# Patient Record
Sex: MALE | Race: WHITE | NOT HISPANIC OR LATINO | Employment: FULL TIME | URBAN - METROPOLITAN AREA
[De-identification: names, ages, dates, MRNs, and addresses within clinical notes are randomized per-mention and may not be internally consistent; named-entity substitution may affect disease eponyms.]

---

## 2018-06-18 DIAGNOSIS — E29.1 HYPOGONADISM IN MALE: Primary | ICD-10-CM

## 2018-06-18 RX ORDER — TESTOSTERONE CYPIONATE 200 MG/ML
INJECTION INTRAMUSCULAR
Qty: 10 ML | Refills: 5 | Status: SHIPPED | OUTPATIENT
Start: 2018-06-18 | End: 2018-06-20 | Stop reason: SDUPTHER

## 2018-06-18 NOTE — TELEPHONE ENCOUNTER
Pt needs script for depo-testosterone 200 mg/ one 10ml vial/ 3/4 of 1cc every 2 weeks/ the 10ml vial usually last 5 months/ [place] [employer] order/ if it needs to be faxed he advised that fax is [phone]/ has pending 11/21/18 appt

## 2018-06-20 DIAGNOSIS — E29.1 HYPOGONADISM IN MALE: ICD-10-CM

## 2018-06-20 RX ORDER — TESTOSTERONE CYPIONATE 200 MG/ML
INJECTION INTRAMUSCULAR
Qty: 10 ML | Refills: 0 | Status: SHIPPED | OUTPATIENT
Start: 2018-06-20 | End: 2019-09-17 | Stop reason: SDUPTHER

## 2018-06-20 NOTE — TELEPHONE ENCOUNTER
Pharmacy called to state that the prescription must say to "Inject intramuscular" or they will not fill the prescription

## 2018-11-06 ENCOUNTER — TELEPHONE (OUTPATIENT)
Dept: ENDOCRINOLOGY | Facility: HOSPITAL | Age: 63
End: 2018-11-06

## 2018-11-06 DIAGNOSIS — E29.1 HYPOGONADISM IN MALE: Primary | ICD-10-CM

## 2018-11-06 NOTE — TELEPHONE ENCOUNTER
Pt needs new lab slip for 11/21/18 appt  PSA, testosterone total and free  (E29 1)  Can be mailed   Thanks

## 2018-11-14 LAB — HBA1C MFR BLD HPLC: 6 %

## 2018-11-15 LAB
ERYTHROCYTE [DISTWIDTH] IN BLOOD BY AUTOMATED COUNT: 13.2 % (ref 12.3–15.4)
HCT VFR BLD AUTO: 48.9 % (ref 37.5–51)
HGB BLD-MCNC: 17.3 G/DL (ref 13–17.7)
MCH RBC QN AUTO: 31.6 PG (ref 26.6–33)
MCHC RBC AUTO-ENTMCNC: 35.4 G/DL (ref 31.5–35.7)
MCV RBC AUTO: 89 FL (ref 79–97)
PLATELET # BLD AUTO: 243 X10E3/UL (ref 150–379)
PSA SERPL-MCNC: 1.5 NG/ML (ref 0–4)
RBC # BLD AUTO: 5.48 X10E6/UL (ref 4.14–5.8)
TESTOST FREE SERPL-MCNC: 18.2 PG/ML (ref 6.6–18.1)
TESTOST SERPL-MCNC: 749 NG/DL (ref 264–916)
WBC # BLD AUTO: 6 X10E3/UL (ref 3.4–10.8)

## 2018-11-21 ENCOUNTER — OFFICE VISIT (OUTPATIENT)
Dept: ENDOCRINOLOGY | Facility: HOSPITAL | Age: 63
End: 2018-11-21
Payer: COMMERCIAL

## 2018-11-21 VITALS
HEART RATE: 103 BPM | SYSTOLIC BLOOD PRESSURE: 154 MMHG | HEIGHT: 71 IN | BODY MASS INDEX: 33.46 KG/M2 | DIASTOLIC BLOOD PRESSURE: 80 MMHG | WEIGHT: 239 LBS

## 2018-11-21 DIAGNOSIS — R73.03 PREDIABETES: ICD-10-CM

## 2018-11-21 DIAGNOSIS — E29.1 PRIMARY HYPOGONADISM IN MALE: Primary | ICD-10-CM

## 2018-11-21 PROCEDURE — 99204 OFFICE O/P NEW MOD 45 MIN: CPT | Performed by: INTERNAL MEDICINE

## 2018-11-21 RX ORDER — HYDROCHLOROTHIAZIDE 25 MG/1
25 TABLET ORAL DAILY
Refills: 0 | COMMUNITY
Start: 2018-11-19

## 2018-11-21 RX ORDER — OMEPRAZOLE 40 MG/1
40 CAPSULE, DELAYED RELEASE ORAL DAILY
Refills: 0 | COMMUNITY
Start: 2018-11-19 | End: 2021-02-01 | Stop reason: ALTCHOICE

## 2018-11-21 NOTE — LETTER
November 21, 2018     Corrine Halsted  [address]  [place] [address]    Patient: Cyn Marino   YOB: 1955   Date of Visit: 11/21/2018       Dear Dr Maryse Kumar: Thank you for referring Cyn Marino to me for evaluation  Below are my notes for this consultation  If you have questions, please do not hesitate to call me  I look forward to following your patient along with you  Sincerely,        Diana Sadler DO        CC: No Recipients  Diana Sadler DO  11/21/2018  8:12 AM  Sign at close encounter  11/21/2018    Assessment/Plan      Diagnoses and all orders for this visit:    Primary hypogonadism in male  -     Testosterone, free, total Lab Collect  -     CBC and Platelet- Lab Collect  -     Testosterone, free, total Lab Collect; Future  -     CBC and Platelet- Lab Collect; Future  -     Testosterone, free, total Lab Collect  -     CBC and Platelet- Lab Collect  -     PSA, total screen Lab Collect; Future    Prediabetes  -     HEMOGLOBIN A1C W/ EAG ESTIMATION Lab Collect; Future  -     Comprehensive metabolic panel Lab Collect; Future  -     Lipid Panel with Direct LDL reflex Lab Collect; Future  -     HEMOGLOBIN A1C W/ EAG ESTIMATION Lab Collect  -     Comprehensive metabolic panel Lab Collect  -     Lipid Panel with Direct LDL reflex Lab Collect    Other orders  -     omeprazole (PriLOSEC) 40 MG capsule; Take 40 mg by mouth daily  -     hydrochlorothiazide (HYDRODIURIL) 25 mg tablet; Take 25 mg by mouth daily        Assessment/Plan:  1  Primary hypogonadism:   Clinically he is doing well on testosterone cypionate 200mg/ml 0 75 cc every 2 weeks  His recent testosterone level was 7-8 days after an injection  I have suggested we recheck a free and total testosterone along with CBC about 3 days after an injection to make sure his peak is not going too high  If it is we will adjust his dose appropriately    Otherwise we will plan to see him back in 1 year with labs as ordered above just prior  2   Prediabetes:  Discussed diet, exercise, lifestyle modifications  Will check an A1c and CMP before next appointment which will be in 1 year  CC:   Hypogonadism    History of Present Illness     HPI: Alex Hernandez is a 63y o  year old male with history of primary hypogonadism who presents to establish care  Previously followed doctor Carrington  He is currently managed on testosterone cypionate 75 cc every 2 weeks  He presents today to establish care and overall is feeling well  He notes adequate energy, muscle strength, libido, denies erectile dysfunction  He states he has primary hypogonadism due to 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  He has been on testosterone shots since he was in his 25s  He has not been interested in any gel, intranasal, or other Testosterone method  He does not want to do the testosterone more than every 2 weeks as yes to go to his primary care doctor for the injection  Otherwise he feels well  He has a personal history of prediabetes and is working hard to monitor diet, exercise, lifestyle factors  Review of Systems   Constitutional: Negative for fatigue  HENT: Negative for trouble swallowing and voice change  Eyes: Negative for visual disturbance  Respiratory: Negative for shortness of breath  Cardiovascular: Negative for palpitations and leg swelling  Gastrointestinal: Negative for abdominal pain, nausea and vomiting  Endocrine: Negative for cold intolerance, heat intolerance, polydipsia and polyuria  Musculoskeletal: Negative for arthralgias and myalgias  Skin: Negative for rash  Neurological: Negative for dizziness, tremors and weakness  Hematological: Negative for adenopathy  Psychiatric/Behavioral: Negative for agitation and confusion  Historical Information   No past medical history on file  No past surgical history on file    Social History   History   Alcohol use Not on file     History   Drug use: Unknown     History   Smoking Status    Never Smoker   Smokeless Tobacco    Never Used     Family History:   Family History   Problem Relation Age of Onset    Diabetes type II Mother     Heart attack Mother     Heart disease Father     Diabetes type II Father     Osteoporosis Sister     Diabetes type II Brother     Heart disease Brother     No Known Problems Brother        Meds/Allergies   Current Outpatient Prescriptions   Medication Sig Dispense Refill    hydrochlorothiazide (HYDRODIURIL) 25 mg tablet Take 25 mg by mouth daily  0    omeprazole (PriLOSEC) 40 MG capsule Take 40 mg by mouth daily  0    testosterone cypionate (DEPO-TESTOSTERONE) 200 mg/mL SOLN Inject intramuscular 0 75 cc every 14 days  10 mL 0     No current facility-administered medications for this visit  No Known Allergies    Objective   Vitals: Blood pressure 154/80, pulse 103, height 5' 11" (1 803 m), weight 108 kg (239 lb)  Invasive Devices          No matching active lines, drains, or airways          Physical Exam   Constitutional: He is oriented to person, place, and time  He appears well-developed and well-nourished  No distress  HENT:   Head: Normocephalic and atraumatic  Eyes: Pupils are equal, round, and reactive to light  Conjunctivae are normal    Neck: Normal range of motion  Neck supple  No thyromegaly present  Cardiovascular: Normal rate and regular rhythm  Pulmonary/Chest: Effort normal and breath sounds normal  No respiratory distress  Abdominal: Soft  Bowel sounds are normal  He exhibits no distension  Musculoskeletal: Normal range of motion  He exhibits no edema  Neurological: He is alert and oriented to person, place, and time  He exhibits normal muscle tone  Skin: Skin is warm and dry  No rash noted  He is not diaphoretic  Psychiatric: He has a normal mood and affect  His behavior is normal    Vitals reviewed        The history was obtained from the review of the chart and from the patient  Lab Results:      Recent Results (from the past 04766 hour(s))   CBC    Collection Time: 11/14/18  7:25 AM   Result Value Ref Range    White Blood Cell Count 6 0 3 4 - 10 8 x10E3/uL    Red Blood Cell Count 5 48 4 14 - 5 80 x10E6/uL    Hemoglobin 17 3 13 0 - 17 7 g/dL    HCT 48 9 37 5 - 51 0 %    MCV 89 79 - 97 fL    MCH 31 6 26 6 - 33 0 pg    MCHC 35 4 31 5 - 35 7 g/dL    RDW 13 2 12 3 - 15 4 %    Platelet Count 111 935 - 379 x10E3/uL   Testosterone, free, total    Collection Time: 11/14/18  7:25 AM   Result Value Ref Range    TESTOSTERONE TOTAL 749 264 - 916 ng/dL    Testosterone, Free 18 2 (H) 6 6 - 18 1 pg/mL   PSA Total, Diagnostic    Collection Time: 11/14/18  7:25 AM   Result Value Ref Range    Prostate Specific Antigen Total 1 5 0 0 - 4 0 ng/mL     Labs from [facility] on 11/14/2018:  Hematocrit 47 3, glucose 112 fasting, liver function within normal limits, , HDL 46, total cholesterol 125, TSH 2 11, free T4 1 0, A1c 6 0  No future appointments  Portions of the record may have been created with voice recognition software  Occasional wrong word or "sound a like" substitutions may have occurred due to the inherent limitations of voice recognition software  Read the chart carefully and recognize, using context, where substitutions have occurred

## 2018-11-21 NOTE — PROGRESS NOTES
11/21/2018    Assessment/Plan      Diagnoses and all orders for this visit:    Primary hypogonadism in male  -     Testosterone, free, total Lab Collect  -     CBC and Platelet- Lab Collect  -     Testosterone, free, total Lab Collect; Future  -     CBC and Platelet- Lab Collect; Future  -     Testosterone, free, total Lab Collect  -     CBC and Platelet- Lab Collect  -     PSA, total screen Lab Collect; Future    Prediabetes  -     HEMOGLOBIN A1C W/ EAG ESTIMATION Lab Collect; Future  -     Comprehensive metabolic panel Lab Collect; Future  -     Lipid Panel with Direct LDL reflex Lab Collect; Future  -     HEMOGLOBIN A1C W/ EAG ESTIMATION Lab Collect  -     Comprehensive metabolic panel Lab Collect  -     Lipid Panel with Direct LDL reflex Lab Collect    Other orders  -     omeprazole (PriLOSEC) 40 MG capsule; Take 40 mg by mouth daily  -     hydrochlorothiazide (HYDRODIURIL) 25 mg tablet; Take 25 mg by mouth daily        Assessment/Plan:  1  Primary hypogonadism:   Clinically he is doing well on testosterone cypionate 200mg/ml 0 75 cc every 2 weeks  His recent testosterone level was 7-8 days after an injection  I have suggested we recheck a free and total testosterone along with CBC about 3 days after an injection to make sure his peak is not going too high  If it is we will adjust his dose appropriately  Otherwise we will plan to see him back in 1 year with labs as ordered above just prior  2   Prediabetes:  Discussed diet, exercise, lifestyle modifications  Will check an A1c and CMP before next appointment which will be in 1 year  CC:   Hypogonadism    History of Present Illness     HPI: Susanna Ashley is a 63y o  year old male with history of primary hypogonadism who presents to establish care  Previously followed doctor Shultz  He is currently managed on testosterone cypionate 75 cc every 2 weeks  He presents today to establish care and overall is feeling well    He notes adequate energy, muscle strength, libido, denies erectile dysfunction  He states he has primary hypogonadism due to 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  He has been on testosterone shots since he was in his 25s  He has not been interested in any gel, intranasal, or other Testosterone method  He does not want to do the testosterone more than every 2 weeks as yes to go to his primary care doctor for the injection  Otherwise he feels well  He has a personal history of prediabetes and is working hard to monitor diet, exercise, lifestyle factors  Review of Systems   Constitutional: Negative for fatigue  HENT: Negative for trouble swallowing and voice change  Eyes: Negative for visual disturbance  Respiratory: Negative for shortness of breath  Cardiovascular: Negative for palpitations and leg swelling  Gastrointestinal: Negative for abdominal pain, nausea and vomiting  Endocrine: Negative for cold intolerance, heat intolerance, polydipsia and polyuria  Musculoskeletal: Negative for arthralgias and myalgias  Skin: Negative for rash  Neurological: Negative for dizziness, tremors and weakness  Hematological: Negative for adenopathy  Psychiatric/Behavioral: Negative for agitation and confusion  Historical Information   No past medical history on file  No past surgical history on file    Social History   History   Alcohol use Not on file     History   Drug use: Unknown     History   Smoking Status    Never Smoker   Smokeless Tobacco    Never Used     Family History:   Family History   Problem Relation Age of Onset    Diabetes type II Mother     Heart attack Mother     Heart disease Father     Diabetes type II Father     Osteoporosis Sister     Diabetes type II Brother     Heart disease Brother     No Known Problems Brother        Meds/Allergies   Current Outpatient Prescriptions   Medication Sig Dispense Refill    hydrochlorothiazide (HYDRODIURIL) 25 mg tablet Take 25 mg by mouth daily  0    omeprazole (PriLOSEC) 40 MG capsule Take 40 mg by mouth daily  0    testosterone cypionate (DEPO-TESTOSTERONE) 200 mg/mL SOLN Inject intramuscular 0 75 cc every 14 days  10 mL 0     No current facility-administered medications for this visit  No Known Allergies    Objective   Vitals: Blood pressure 154/80, pulse 103, height 5' 11" (1 803 m), weight 108 kg (239 lb)  Invasive Devices          No matching active lines, drains, or airways          Physical Exam   Constitutional: He is oriented to person, place, and time  He appears well-developed and well-nourished  No distress  HENT:   Head: Normocephalic and atraumatic  Eyes: Pupils are equal, round, and reactive to light  Conjunctivae are normal    Neck: Normal range of motion  Neck supple  No thyromegaly present  Cardiovascular: Normal rate and regular rhythm  Pulmonary/Chest: Effort normal and breath sounds normal  No respiratory distress  Abdominal: Soft  Bowel sounds are normal  He exhibits no distension  Musculoskeletal: Normal range of motion  He exhibits no edema  Neurological: He is alert and oriented to person, place, and time  He exhibits normal muscle tone  Skin: Skin is warm and dry  No rash noted  He is not diaphoretic  Psychiatric: He has a normal mood and affect  His behavior is normal    Vitals reviewed  The history was obtained from the review of the chart and from the patient      Lab Results:      Recent Results (from the past 17396 hour(s))   CBC    Collection Time: 11/14/18  7:25 AM   Result Value Ref Range    White Blood Cell Count 6 0 3 4 - 10 8 x10E3/uL    Red Blood Cell Count 5 48 4 14 - 5 80 x10E6/uL    Hemoglobin 17 3 13 0 - 17 7 g/dL    HCT 48 9 37 5 - 51 0 %    MCV 89 79 - 97 fL    MCH 31 6 26 6 - 33 0 pg    MCHC 35 4 31 5 - 35 7 g/dL    RDW 13 2 12 3 - 15 4 %    Platelet Count 106 053 - 379 x10E3/uL   Testosterone, free, total    Collection Time: 11/14/18  7:25 AM Result Value Ref Range    TESTOSTERONE TOTAL 749 264 - 916 ng/dL    Testosterone, Free 18 2 (H) 6 6 - 18 1 pg/mL   PSA Total, Diagnostic    Collection Time: 11/14/18  7:25 AM   Result Value Ref Range    Prostate Specific Antigen Total 1 5 0 0 - 4 0 ng/mL     Labs from [address] [place] [address] on 11/14/2018:  Hematocrit 47 3, glucose 112 fasting, liver function within normal limits, , HDL 46, total cholesterol 125, TSH 2 11, free T4 1 0, A1c 6 0  No future appointments  Portions of the record may have been created with voice recognition software  Occasional wrong word or "sound a like" substitutions may have occurred due to the inherent limitations of voice recognition software  Read the chart carefully and recognize, using context, where substitutions have occurred

## 2019-03-04 ENCOUNTER — TELEPHONE (OUTPATIENT)
Dept: ENDOCRINOLOGY | Facility: HOSPITAL | Age: 64
End: 2019-03-04

## 2019-03-04 NOTE — TELEPHONE ENCOUNTER
Testosterone level from before suggests the testosterone was being absorbed fine  I would suggest outer thigh as the preferred option  I do not think there is any difference if it is given over 8-10 seconds vs faster than that  Yes he can be taught to inject it himself if he wants we can show him

## 2019-03-04 NOTE — TELEPHONE ENCOUNTER
Spoke to patient & told him what you advised  He has a few more questions  Wants to know if you think it was a mistake to get the shot in the arm all these years? Does the testosterone not disperse as well in the body as when given in the thigh or buttocks? Would like you to rate the 3 areas from best to worst   Should the shot be given slowly (8-10 seconds) & will it disperse the same as it would if given fast?  Can he be taught to give himself the injections? Basically he's concerned about why his PCP doesn't want to give him the shot in the arm anymore & that she's giving the shot too quickly

## 2019-03-04 NOTE — TELEPHONE ENCOUNTER
Patient's pcp has been giving him testosterone shots every 2 weeks  She does not want to give it to patient in his arm anymore  He would like suggestions on better options  Which area would your recommend for shot (arm, buttocks, thigh)? ? And what area do you feel would work the best?? He is not due for another shot until next Tuesday   Please advise

## 2019-03-05 NOTE — TELEPHONE ENCOUNTER
Spoke to patient and relayed all of the information  Told him Lissette Brown could teach him how to self administer the injections if he's interested  He'll think about it & schedule an appt if he decides to do it

## 2019-04-26 ENCOUNTER — TELEPHONE (OUTPATIENT)
Dept: ENDOCRINOLOGY | Facility: HOSPITAL | Age: 64
End: 2019-04-26

## 2019-05-10 ENCOUNTER — TELEPHONE (OUTPATIENT)
Dept: ENDOCRINOLOGY | Facility: HOSPITAL | Age: 64
End: 2019-05-10

## 2019-09-12 ENCOUNTER — TELEPHONE (OUTPATIENT)
Dept: ENDOCRINOLOGY | Facility: HOSPITAL | Age: 64
End: 2019-09-12

## 2019-09-17 DIAGNOSIS — E29.1 HYPOGONADISM IN MALE: ICD-10-CM

## 2019-09-17 RX ORDER — TESTOSTERONE CYPIONATE 200 MG/ML
INJECTION, SOLUTION INTRAMUSCULAR
Qty: 1 ML | Refills: 5 | Status: SHIPPED | OUTPATIENT
Start: 2019-09-17 | End: 2019-09-23

## 2019-09-17 RX ORDER — TESTOSTERONE CYPIONATE 200 MG/ML
INJECTION INTRAMUSCULAR
Qty: 1 ML | Refills: 0 | Status: SHIPPED | OUTPATIENT
Start: 2019-09-17 | End: 2019-09-17 | Stop reason: SDUPTHER

## 2019-09-23 DIAGNOSIS — E29.1 HYPOGONADISM IN MALE: ICD-10-CM

## 2019-09-24 ENCOUNTER — TELEPHONE (OUTPATIENT)
Dept: ENDOCRINOLOGY | Facility: HOSPITAL | Age: 64
End: 2019-09-24

## 2019-09-24 RX ORDER — TESTOSTERONE CYPIONATE 200 MG/ML
INJECTION, SOLUTION INTRAMUSCULAR
Qty: 2 ML | Refills: 5 | Status: SHIPPED | OUTPATIENT
Start: 2019-09-24 | End: 2019-10-30 | Stop reason: SDUPTHER

## 2019-09-24 NOTE — TELEPHONE ENCOUNTER
I had called patient to because we received a call from [address] [place] [address] from the answering service  Patient wanted you to be aware he is getting his injection today and he will have his 3 day labs after the injection done on Friday

## 2019-10-28 LAB
ERYTHROCYTE [DISTWIDTH] IN BLOOD BY AUTOMATED COUNT: 13.5 % (ref 12.3–15.4)
HCT VFR BLD AUTO: 49.3 % (ref 37.5–51)
HGB BLD-MCNC: 16.6 G/DL (ref 13–17.7)
MCH RBC QN AUTO: 30.7 PG (ref 26.6–33)
MCHC RBC AUTO-ENTMCNC: 33.7 G/DL (ref 31.5–35.7)
MCV RBC AUTO: 91 FL (ref 79–97)
PLATELET # BLD AUTO: 236 X10E3/UL (ref 150–450)
RBC # BLD AUTO: 5.4 X10E6/UL (ref 4.14–5.8)
TESTOST FREE SERPL-MCNC: 26 PG/ML (ref 6.6–18.1)
TESTOST SERPL-MCNC: 962 NG/DL (ref 264–916)
WBC # BLD AUTO: 5.9 X10E3/UL (ref 3.4–10.8)

## 2019-10-30 DIAGNOSIS — E29.1 HYPOGONADISM IN MALE: ICD-10-CM

## 2019-10-30 DIAGNOSIS — E29.1 HYPOGONADISM IN MALE: Primary | ICD-10-CM

## 2019-10-30 RX ORDER — TESTOSTERONE CYPIONATE 200 MG/ML
INJECTION, SOLUTION INTRAMUSCULAR
Qty: 2 ML | Refills: 5 | Status: SHIPPED | OUTPATIENT
Start: 2019-10-30 | End: 2020-03-16 | Stop reason: SDUPTHER

## 2019-10-30 NOTE — TELEPHONE ENCOUNTER
Patient would like to let you know that his labs from 10/26 were done 4 days after the injection     He would like to know if he should wait 8 days after the injection for the next set of blood work?     Fax script to [phone]

## 2019-11-16 LAB
ALBUMIN SERPL-MCNC: 4.5 G/DL (ref 3.6–4.8)
ALBUMIN/GLOB SERPL: 1.6 {RATIO} (ref 1.2–2.2)
ALP SERPL-CCNC: 70 IU/L (ref 39–117)
ALT SERPL-CCNC: 54 IU/L (ref 0–44)
AST SERPL-CCNC: 38 IU/L (ref 0–40)
BILIRUB SERPL-MCNC: 1.1 MG/DL (ref 0–1.2)
BUN SERPL-MCNC: 19 MG/DL (ref 8–27)
BUN/CREAT SERPL: 19 (ref 10–24)
CALCIUM SERPL-MCNC: 9.6 MG/DL (ref 8.6–10.2)
CHLORIDE SERPL-SCNC: 97 MMOL/L (ref 96–106)
CHOLEST SERPL-MCNC: 214 MG/DL (ref 100–199)
CO2 SERPL-SCNC: 25 MMOL/L (ref 20–29)
CREAT SERPL-MCNC: 0.99 MG/DL (ref 0.76–1.27)
ERYTHROCYTE [DISTWIDTH] IN BLOOD BY AUTOMATED COUNT: 13.6 % (ref 12.3–15.4)
EST. AVERAGE GLUCOSE BLD GHB EST-MCNC: 126 MG/DL
GLOBULIN SER-MCNC: 2.8 G/DL (ref 1.5–4.5)
GLUCOSE SERPL-MCNC: 117 MG/DL (ref 65–99)
HBA1C MFR BLD: 6 % (ref 4.8–5.6)
HCT VFR BLD AUTO: 48.9 % (ref 37.5–51)
HDLC SERPL-MCNC: 47 MG/DL
HGB BLD-MCNC: 16.8 G/DL (ref 13–17.7)
LDLC SERPL CALC-MCNC: 140 MG/DL (ref 0–99)
LDLC/HDLC SERPL: 3 RATIO (ref 0–3.6)
MCH RBC QN AUTO: 30.8 PG (ref 26.6–33)
MCHC RBC AUTO-ENTMCNC: 34.4 G/DL (ref 31.5–35.7)
MCV RBC AUTO: 90 FL (ref 79–97)
PLATELET # BLD AUTO: 233 X10E3/UL (ref 150–450)
POTASSIUM SERPL-SCNC: 4.1 MMOL/L (ref 3.5–5.2)
PROT SERPL-MCNC: 7.3 G/DL (ref 6–8.5)
PSA SERPL-MCNC: 1.1 NG/ML (ref 0–4)
RBC # BLD AUTO: 5.46 X10E6/UL (ref 4.14–5.8)
SL AMB EGFR AFRICAN AMERICAN: 95 ML/MIN/1.73
SL AMB EGFR NON AFRICAN AMERICAN: 82 ML/MIN/1.73
SL AMB VLDL CHOLESTEROL CALC: 27 MG/DL (ref 5–40)
SODIUM SERPL-SCNC: 138 MMOL/L (ref 134–144)
TESTOST FREE SERPL-MCNC: 11 PG/ML (ref 6.6–18.1)
TESTOST SERPL-MCNC: 453 NG/DL (ref 264–916)
TRIGL SERPL-MCNC: 137 MG/DL (ref 0–149)
WBC # BLD AUTO: 5.6 X10E3/UL (ref 3.4–10.8)

## 2019-12-02 ENCOUNTER — OFFICE VISIT (OUTPATIENT)
Dept: ENDOCRINOLOGY | Facility: HOSPITAL | Age: 64
End: 2019-12-02
Payer: COMMERCIAL

## 2019-12-02 VITALS
DIASTOLIC BLOOD PRESSURE: 80 MMHG | SYSTOLIC BLOOD PRESSURE: 126 MMHG | BODY MASS INDEX: 32.96 KG/M2 | WEIGHT: 235.4 LBS | HEIGHT: 71 IN | HEART RATE: 87 BPM

## 2019-12-02 DIAGNOSIS — R73.03 PREDIABETES: ICD-10-CM

## 2019-12-02 DIAGNOSIS — E29.1 PRIMARY HYPOGONADISM IN MALE: Primary | ICD-10-CM

## 2019-12-02 PROCEDURE — 99214 OFFICE O/P EST MOD 30 MIN: CPT | Performed by: INTERNAL MEDICINE

## 2019-12-02 NOTE — PROGRESS NOTES
12/2/2019    Assessment/Plan      Diagnoses and all orders for this visit:    Primary hypogonadism in male  -     Testosterone, free, total Lab Collect; Future  -     Comprehensive metabolic panel Lab Collect; Future  -     PSA, total screen Lab Collect; Future  -     Lipid Panel with Direct LDL reflex Lab Collect; Future  -     CBC and differential Lab Collect; Future  -     Testosterone, free, total Lab Collect  -     Comprehensive metabolic panel Lab Collect  -     Lipid Panel with Direct LDL reflex Lab Collect  -     CBC and differential Lab Collect  -     Testosterone, free, total Lab Collect; Future  -     CBC and differential Lab Collect; Future  -     Testosterone, free, total Lab Collect  -     CBC and differential Lab Collect    Prediabetes  -     HEMOGLOBIN A1C W/ EAG ESTIMATION Lab Collect; Future  -     HEMOGLOBIN A1C W/ EAG ESTIMATION Lab Collect        Assessment/Plan:  1  Primary hypogonadism:  Clinically and biochemically doing well on current regimen  Follow-up in 1 year with labs as ordered above just prior  He can call sooner with any questions or concerns  His prior provider monitored testosterone levels both 4 days as well as 9 or 10 days after an injection which I have ordered  2  Prediabetes:  Stable on current regimen  Encourage continue lifestyle intervention  CC: Follow-up    History of Present Illness     HPI: Pato Wills is a 66y o  year old male with history of primary hypogonadism who presents for a follow-up appointment  He is maintained on depo testosterone 200 mg/ml, 0 75 cc every 14 days  He has primary hypogonadism due to 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  He has been on testosterone injections since his 25s  In the past he has not been interested in any type of transdermal testosterone supplementation  He gets the injection through his family doctor  Presents today overall feeling well    No new health issues or symptoms of concern  Review of Systems   Constitutional: Negative for fatigue  HENT: Negative for trouble swallowing and voice change  Eyes: Negative for visual disturbance  Respiratory: Negative for shortness of breath  Cardiovascular: Negative for palpitations and leg swelling  Gastrointestinal: Negative for abdominal pain, nausea and vomiting  Endocrine: Negative for polydipsia and polyuria  Musculoskeletal: Negative for arthralgias and myalgias  Skin: Negative for rash  Neurological: Negative for dizziness, tremors and weakness  Hematological: Negative for adenopathy  Psychiatric/Behavioral: Negative for agitation and confusion  Historical Information   History reviewed  No pertinent past medical history  History reviewed  No pertinent surgical history  Social History   Social History     Substance and Sexual Activity   Alcohol Use Not on file     Social History     Substance and Sexual Activity   Drug Use Not on file     Social History     Tobacco Use   Smoking Status Never Smoker   Smokeless Tobacco Never Used     Family History:   Family History   Problem Relation Age of Onset    Diabetes type II Mother     Heart attack Mother     Heart disease Father     Diabetes type II Father     Osteoporosis Sister     Diabetes type II Brother     Heart disease Brother     No Known Problems Brother        Meds/Allergies   Current Outpatient Medications   Medication Sig Dispense Refill    DEPO-TESTOSTERONE 200 MG/ML SOLN Inject intramuscular 0 75 cc every 14 days  BRAND AS PREVIOUSLY RECEIVED  2 mL 5    hydrochlorothiazide (HYDRODIURIL) 25 mg tablet Take 25 mg by mouth daily  0    omeprazole (PriLOSEC) 40 MG capsule Take 40 mg by mouth daily  0     No current facility-administered medications for this visit  No Known Allergies    Objective   Vitals: Blood pressure 126/80, pulse 87, height 5' 11" (1 803 m), weight 107 kg (235 lb 6 4 oz)    Invasive Devices     None Physical Exam   Constitutional: He is oriented to person, place, and time  He appears well-developed and well-nourished  No distress  HENT:   Head: Normocephalic and atraumatic  Neck: Normal range of motion  Neck supple  No thyromegaly present  Cardiovascular: Normal rate and regular rhythm  Pulmonary/Chest: Effort normal and breath sounds normal  No respiratory distress  Abdominal: Soft  Bowel sounds are normal    Musculoskeletal: Normal range of motion  He exhibits no edema  Neurological: He is alert and oriented to person, place, and time  He exhibits normal muscle tone  Skin: Skin is warm and dry  No rash noted  He is not diaphoretic  Psychiatric: He has a normal mood and affect  His behavior is normal    Vitals reviewed  The history was obtained from the review of the chart and from the patient      Lab Results:      Recent Results (from the past 82942 hour(s))   Hemoglobin A1C    Collection Time: 11/14/18 12:00 AM   Result Value Ref Range    Hemoglobin A1C 6 0    CBC    Collection Time: 11/14/18  7:25 AM   Result Value Ref Range    White Blood Cell Count 6 0 3 4 - 10 8 x10E3/uL    Red Blood Cell Count 5 48 4 14 - 5 80 x10E6/uL    Hemoglobin 17 3 13 0 - 17 7 g/dL    HCT 48 9 37 5 - 51 0 %    MCV 89 79 - 97 fL    MCH 31 6 26 6 - 33 0 pg    MCHC 35 4 31 5 - 35 7 g/dL    RDW 13 2 12 3 - 15 4 %    Platelet Count 684 386 - 379 x10E3/uL   Testosterone, free, total    Collection Time: 11/14/18  7:25 AM   Result Value Ref Range    TESTOSTERONE TOTAL 749 264 - 916 ng/dL    Testosterone, Free 18 2 (H) 6 6 - 18 1 pg/mL   PSA Total, Diagnostic    Collection Time: 11/14/18  7:25 AM   Result Value Ref Range    Prostate Specific Antigen Total 1 5 0 0 - 4 0 ng/mL   Testosterone, free, total Lab Collect    Collection Time: 10/26/19  8:08 AM   Result Value Ref Range    TESTOSTERONE TOTAL 962 (H) 264 - 916 ng/dL    Testosterone, Free 26 0 (H) 6 6 - 18 1 pg/mL   CBC and Platelet- Lab Collect    Collection Time: 10/26/19  8:08 AM   Result Value Ref Range    White Blood Cell Count 5 9 3 4 - 10 8 x10E3/uL    Red Blood Cell Count 5 40 4  14 - 5 80 x10E6/uL    Hemoglobin 16 6 13 0 - 17 7 g/dL    HCT 49 3 37 5 - 51 0 %    MCV 91 79 - 97 fL    MCH 30 7 26 6 - 33 0 pg    MCHC 33 7 31 5 - 35 7 g/dL    RDW 13 5 12 3 - 15 4 %    Platelet Count 439 624 - 450 x10E3/uL   Testosterone, free, total Lab Collect    Collection Time: 11/15/19  7:12 AM   Result Value Ref Range    TESTOSTERONE TOTAL 453 264 - 916 ng/dL    Testosterone, Free 11 0 6 6 - 18 1 pg/mL   CBC and Platelet- Lab Collect    Collection Time: 11/15/19  7:12 AM   Result Value Ref Range    White Blood Cell Count 5 6 3 4 - 10 8 x10E3/uL    Red Blood Cell Count 5 46 4 14 - 5 80 x10E6/uL    Hemoglobin 16 8 13 0 - 17 7 g/dL    HCT 48 9 37 5 - 51 0 %    MCV 90 79 - 97 fL    MCH 30 8 26 6 - 33 0 pg    MCHC 34 4 31 5 - 35 7 g/dL    RDW 13 6 12 3 - 15 4 %    Platelet Count 571 965 - 450 x10E3/uL   HEMOGLOBIN A1C W/ EAG ESTIMATION Lab Collect    Collection Time: 11/15/19  7:12 AM   Result Value Ref Range    Hemoglobin A1C 6 0 (H) 4 8 - 5 6 %    Estimated Average Glucose 126 mg/dL   Comprehensive metabolic panel Lab Collect    Collection Time: 11/15/19  7:12 AM   Result Value Ref Range    Glucose, Random 117 (H) 65 - 99 mg/dL    BUN 19 8 - 27 mg/dL    Creatinine 0 99 0 76 - 1 27 mg/dL    eGFR Non  82 >59 mL/min/1 73    eGFR  95 >59 mL/min/1 73    SL AMB BUN/CREATININE RATIO 19 10 - 24    Sodium 138 134 - 144 mmol/L    Potassium 4 1 3 5 - 5 2 mmol/L    Chloride 97 96 - 106 mmol/L    CO2 25 20 - 29 mmol/L    CALCIUM 9 6 8 6 - 10 2 mg/dL    Protein, Total 7 3 6 0 - 8 5 g/dL    Albumin 4 5 3 6 - 4 8 g/dL    Globulin, Total 2 8 1 5 - 4 5 g/dL    Albumin/Globulin Ratio 1 6 1 2 - 2 2    TOTAL BILIRUBIN 1 1 0 0 - 1 2 mg/dL    Alk Phos Isoenzymes 70 39 - 117 IU/L    AST 38 0 - 40 IU/L    ALT 54 (H) 0 - 44 IU/L   Lipid Panel with Direct LDL reflex Lab Collect Collection Time: 11/15/19  7:12 AM   Result Value Ref Range    Cholesterol, Total 214 (H) 100 - 199 mg/dL    Triglycerides 137 0 - 149 mg/dL    HDL 47 >39 mg/dL    VLDL Cholesterol Calculated 27 5 - 40 mg/dL    LDL Direct 140 (H) 0 - 99 mg/dL    LDl/HDL Ratio 3 0 0 0 - 3 6 ratio   PSA Total, Diagnostic    Collection Time: 11/15/19  7:12 AM   Result Value Ref Range    Prostate Specific Antigen Total 1 1 0 0 - 4 0 ng/mL         No future appointments  Portions of the record may have been created with voice recognition software  Occasional wrong word or "sound a like" substitutions may have occurred due to the inherent limitations of voice recognition software  Read the chart carefully and recognize, using context, where substitutions have occurred

## 2020-03-16 DIAGNOSIS — E29.1 HYPOGONADISM IN MALE: ICD-10-CM

## 2020-03-16 RX ORDER — TESTOSTERONE CYPIONATE 200 MG/ML
INJECTION, SOLUTION INTRAMUSCULAR
Qty: 2 ML | Refills: 1 | Status: SHIPPED | OUTPATIENT
Start: 2020-03-16 | End: 2020-03-30 | Stop reason: SDUPTHER

## 2020-03-30 DIAGNOSIS — E29.1 HYPOGONADISM IN MALE: ICD-10-CM

## 2020-03-30 RX ORDER — TESTOSTERONE CYPIONATE 200 MG/ML
INJECTION INTRAMUSCULAR
Qty: 2 ML | Refills: 1 | Status: SHIPPED | OUTPATIENT
Start: 2020-03-30 | End: 2020-09-01 | Stop reason: SDUPTHER

## 2020-03-30 NOTE — TELEPHONE ENCOUNTER
Spoke with patient's pharmacy  He is requesting the Depo-testosterone 200mg/ml be written with a JAMES so that the patient will received what is cheaper  Whether it be brand or generic  Please advise

## 2020-04-23 ENCOUNTER — TELEPHONE (OUTPATIENT)
Dept: ENDOCRINOLOGY | Facility: HOSPITAL | Age: 65
End: 2020-04-23

## 2020-09-01 ENCOUNTER — TELEPHONE (OUTPATIENT)
Dept: ENDOCRINOLOGY | Facility: HOSPITAL | Age: 65
End: 2020-09-01

## 2020-09-01 DIAGNOSIS — E29.1 HYPOGONADISM IN MALE: Primary | ICD-10-CM

## 2020-09-01 RX ORDER — TESTOSTERONE CYPIONATE 200 MG/ML
INJECTION, SOLUTION INTRAMUSCULAR
Qty: 10 ML | Refills: 0 | Status: SHIPPED | OUTPATIENT
Start: 2020-09-01 | End: 2020-09-18 | Stop reason: SDUPTHER

## 2020-09-01 NOTE — TELEPHONE ENCOUNTER
[facility] sent a request to refill pt's Testosterone but he hasn't been seen since December and no follow up is scheduled  Are you ok with filling a 90 day with 0 refills and advise follow up for December?

## 2020-09-18 DIAGNOSIS — E29.1 HYPOGONADISM IN MALE: Primary | ICD-10-CM

## 2020-09-18 LAB
CREAT ?TM UR-SCNC: 221 UMOL/L
EXT MICROALBUMIN URINE RANDOM: 5
HBA1C MFR BLD HPLC: 6 %
MICROALBUMIN/CREAT UR: 2 MG/G{CREAT}

## 2020-09-18 RX ORDER — TESTOSTERONE CYPIONATE 200 MG/ML
INJECTION INTRAMUSCULAR
Qty: 10 ML | Refills: 0 | Status: SHIPPED | OUTPATIENT
Start: 2020-09-18 | End: 2021-01-22

## 2020-09-18 NOTE — TELEPHONE ENCOUNTER
Pt called and said [facility] Haim is telling him he needs another script for his testosterone sent because on their end his script says 2000 mg/mL and not 200 MG/ML  Can you send this for Dr Angelica Casanova please

## 2020-12-18 ENCOUNTER — TELEPHONE (OUTPATIENT)
Dept: ENDOCRINOLOGY | Facility: HOSPITAL | Age: 65
End: 2020-12-18

## 2020-12-18 DIAGNOSIS — R73.03 PREDIABETES: ICD-10-CM

## 2020-12-18 DIAGNOSIS — E29.1 HYPOGONADISM IN MALE: Primary | ICD-10-CM

## 2021-01-20 ENCOUNTER — TELEPHONE (OUTPATIENT)
Dept: ENDOCRINOLOGY | Facility: HOSPITAL | Age: 66
End: 2021-01-20

## 2021-01-20 NOTE — TELEPHONE ENCOUNTER
Patient called  He notes that he got an A1c, CBC, and CMP done last September and does not want to repeat these until the end of 2021   I advised him that you would probably still want these levels checked before his appointment next week but he requested that I check with you

## 2021-01-20 NOTE — TELEPHONE ENCOUNTER
The last labs I have is from the fall of 2019 so if these are his last labs I would repeat the labs as ordered

## 2021-01-22 DIAGNOSIS — E29.1 HYPOGONADISM IN MALE: ICD-10-CM

## 2021-01-22 RX ORDER — TESTOSTERONE CYPIONATE 200 MG/ML
INJECTION INTRAMUSCULAR
Qty: 5 VIAL | Refills: 0 | Status: SHIPPED | OUTPATIENT
Start: 2021-01-22 | End: 2021-02-01 | Stop reason: SDUPTHER

## 2021-01-23 LAB
ALBUMIN SERPL-MCNC: 4.2 G/DL (ref 3.8–4.8)
ALBUMIN/GLOB SERPL: 1.6 {RATIO} (ref 1.2–2.2)
ALP SERPL-CCNC: 66 IU/L (ref 39–117)
ALT SERPL-CCNC: 33 IU/L (ref 0–44)
AST SERPL-CCNC: 32 IU/L (ref 0–40)
BILIRUB SERPL-MCNC: 1.1 MG/DL (ref 0–1.2)
BUN SERPL-MCNC: 15 MG/DL (ref 8–27)
BUN/CREAT SERPL: 16 (ref 10–24)
CALCIUM SERPL-MCNC: 9.4 MG/DL (ref 8.6–10.2)
CHLORIDE SERPL-SCNC: 99 MMOL/L (ref 96–106)
CO2 SERPL-SCNC: 27 MMOL/L (ref 20–29)
CREAT SERPL-MCNC: 0.93 MG/DL (ref 0.76–1.27)
ERYTHROCYTE [DISTWIDTH] IN BLOOD BY AUTOMATED COUNT: 12.7 % (ref 11.6–15.4)
EST. AVERAGE GLUCOSE BLD GHB EST-MCNC: 128 MG/DL
GLOBULIN SER-MCNC: 2.7 G/DL (ref 1.5–4.5)
GLUCOSE SERPL-MCNC: 96 MG/DL (ref 65–99)
HBA1C MFR BLD: 6.1 % (ref 4.8–5.6)
HCT VFR BLD AUTO: 49.8 % (ref 37.5–51)
HGB BLD-MCNC: 16.9 G/DL (ref 13–17.7)
MCH RBC QN AUTO: 30.4 PG (ref 26.6–33)
MCHC RBC AUTO-ENTMCNC: 33.9 G/DL (ref 31.5–35.7)
MCV RBC AUTO: 90 FL (ref 79–97)
PLATELET # BLD AUTO: 232 X10E3/UL (ref 150–450)
POTASSIUM SERPL-SCNC: 4.3 MMOL/L (ref 3.5–5.2)
PROT SERPL-MCNC: 6.9 G/DL (ref 6–8.5)
RBC # BLD AUTO: 5.56 X10E6/UL (ref 4.14–5.8)
SL AMB EGFR AFRICAN AMERICAN: 101 ML/MIN/1.73
SL AMB EGFR NON AFRICAN AMERICAN: 87 ML/MIN/1.73
SODIUM SERPL-SCNC: 138 MMOL/L (ref 134–144)
TESTOST FREE SERPL-MCNC: 11.1 PG/ML (ref 6.6–18.1)
TESTOST SERPL-MCNC: 453 NG/DL (ref 264–916)
WBC # BLD AUTO: 6.4 X10E3/UL (ref 3.4–10.8)

## 2021-01-29 ENCOUNTER — TELEPHONE (OUTPATIENT)
Dept: ENDOCRINOLOGY | Facility: HOSPITAL | Age: 66
End: 2021-01-29

## 2021-01-29 NOTE — TELEPHONE ENCOUNTER
Recent labs show his A1c is stable at 6 1  Testosterone level is excellent at 453  Electrolytes, liver function, kidney function, blood counts all look fine as well  We can certainly reschedule his appointment and repeat labs prior to whenever that appointment is rescheduled as well

## 2021-01-29 NOTE — TELEPHONE ENCOUNTER
Called patient to confirm Monday's appointment  He is concerned about the pending weather and would like a call with his lab results (Testerone and a1c)

## 2021-02-01 ENCOUNTER — TELEPHONE (OUTPATIENT)
Dept: ENDOCRINOLOGY | Facility: HOSPITAL | Age: 66
End: 2021-02-01

## 2021-02-01 ENCOUNTER — TELEMEDICINE (OUTPATIENT)
Dept: ENDOCRINOLOGY | Facility: HOSPITAL | Age: 66
End: 2021-02-01
Payer: COMMERCIAL

## 2021-02-01 DIAGNOSIS — E29.1 PRIMARY HYPOGONADISM IN MALE: Primary | ICD-10-CM

## 2021-02-01 DIAGNOSIS — R73.03 PREDIABETES: Primary | ICD-10-CM

## 2021-02-01 DIAGNOSIS — R73.03 PREDIABETES: ICD-10-CM

## 2021-02-01 DIAGNOSIS — E29.1 PRIMARY HYPOGONADISM IN MALE: ICD-10-CM

## 2021-02-01 DIAGNOSIS — E29.1 HYPOGONADISM IN MALE: ICD-10-CM

## 2021-02-01 PROCEDURE — 99443 PR PHYS/QHP TELEPHONE EVALUATION 21-30 MIN: CPT | Performed by: INTERNAL MEDICINE

## 2021-02-01 RX ORDER — FAMOTIDINE 20 MG/1
20 TABLET, FILM COATED ORAL 2 TIMES DAILY
COMMUNITY
Start: 2021-01-18

## 2021-02-01 RX ORDER — TESTOSTERONE CYPIONATE 200 MG/ML
INJECTION INTRAMUSCULAR
Qty: 5 VIAL | Refills: 0 | Status: SHIPPED | OUTPATIENT
Start: 2021-02-01 | End: 2021-08-25 | Stop reason: SDUPTHER

## 2021-02-01 RX ORDER — MELOXICAM 15 MG/1
15 TABLET ORAL DAILY PRN
COMMUNITY
Start: 2020-12-30

## 2021-02-01 RX ORDER — CETIRIZINE HYDROCHLORIDE 10 MG/1
10 TABLET, CHEWABLE ORAL DAILY
COMMUNITY

## 2021-02-01 NOTE — TELEPHONE ENCOUNTER
When checking patient out he said he would like to get the next set of labs done at the end of November (you had dated for 2/1/22)  If that's ok with you, can you reorder  He also [facility] that [facility]  PSA at that time was 1 4  I will request from [address] [place] [address] when we are back in the office so you can review

## 2021-02-01 NOTE — PROGRESS NOTES
Virtual Brief Visit    Assessment/Plan:    Problem List Items Addressed This Visit        Endocrine    Primary hypogonadism in male    Relevant Orders    Testosterone, free, total Lab Collect    CBC and Platelet- Lab Collect    PSA, total screen Lab Collect       Other    Prediabetes - Primary    Relevant Orders    HEMOGLOBIN A1C W/ EAG ESTIMATION Lab Collect    Comprehensive metabolic panel Lab Collect      Other Visit Diagnoses     Hypogonadism in male        Relevant Medications    testosterone cypionate (DEPO-TESTOSTERONE) 200 mg/mL SOLN      A/P:/  1  Primary hypogonadism:  Overall biochemically and clinically he is doing well on current regimen  Suggest he continue current regimen and repeat labs prior to next appointment which will be in 1 year  I asked him to do labs at the midcycle between injections which will be 7 days after an injection  He did state he received a PSA blood test through his PCP which I will acquire  I did also briefly mention that as time goes on we may relax his testosterone level goals but for now I think we can continue his current regimen and have him follow-up in 1 year  Asked him to call me sooner with any questions or concerns  2  Predm: Lifestyle measures  Monitor annually      ADDENDUM: PSA from 9/18/2020 was 1 4          Reason for visit is   Chief Complaint   Patient presents with    Virtual Brief Visit        Encounter provider Hayder Borjas DO    Provider located at [address] [place] [address] [address] [place] [address]    Recent Visits  Date Type Provider Dept   01/29/21 Telephone [facility] Shira Dalton recent visits within past 7 days and meeting all other requirements     Today's Visits  Date Type Provider Dept   02/01/21 Telemedicine Hayder Borjas DO [facility]   Showing today's visits and meeting all other requirements     Future Appointments  No visits were found meeting these conditions  Showing future appointments within next 150 days and meeting all other requirements        After connecting through telephone, the patient was identified by name and date of birth  Negro Mcarthur was informed that this is a telemedicine visit and that the visit is being conducted through telephone  My office door was closed  No one else was in the room  He acknowledged consent and understanding of privacy and security of the platform  The patient has agreed to participate and understands he can discontinue the visit at any time  Patient is aware this is a billable service  Subjective    Negro Mcarthur is a 63 y o  male with a history of primary hypogonadism who presents for a follow-up appointment  He is maintained on Depo testosterone 200 milligrams/milliliter and takes 0 75 cc every 14 days  Primary hypogonadism is on the basis of 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  He has been on testosterone injections since his 25s  In the past, he has not been interested in any type of transdermal testosterone supplementation  He gets his injections through his family doctor  He presents today overall feeling well  No new health issues or symptoms of concern  HPI     History reviewed  No pertinent past medical history  History reviewed  No pertinent surgical history  Current Outpatient Medications   Medication Sig Dispense Refill    cetirizine (ZyrTEC) 10 MG chewable tablet Chew 10 mg daily      famotidine (PEPCID) 20 mg tablet Take 20 mg by mouth 2 (two) times a day      hydrochlorothiazide (HYDRODIURIL) 25 mg tablet Take 25 mg by mouth daily  0    meloxicam (MOBIC) 15 mg tablet Take 15 mg by mouth daily as needed      testosterone cypionate (DEPO-TESTOSTERONE) 200 mg/mL SOLN INJECT 0 75 ML INTRAMUSCULARLY EVERY 14 DAYS   5 vial 0     No current facility-administered medications for this visit  No Known Allergies    Review of Systems   Constitutional: Negative for fatigue  HENT: Negative for trouble swallowing and voice change  Eyes: Negative for visual disturbance  Respiratory: Negative for shortness of breath  Cardiovascular: Negative for palpitations and leg swelling  Gastrointestinal: Negative for abdominal pain, nausea and vomiting  Endocrine: Negative for polydipsia and polyuria  Musculoskeletal: Negative for arthralgias and myalgias  Skin: Negative for rash  Neurological: Negative for dizziness, tremors and weakness  Hematological: Negative for adenopathy  Psychiatric/Behavioral: Negative for agitation and confusion  There were no vitals filed for this visit  Component      Latest Ref Rng & Units 1/22/2021   Glucose, Random      65 - 99 mg/dL 96   BUN      8 - 27 mg/dL 15   Creatinine      0 76 - 1 27 mg/dL 0 93   eGFR Non       >59 mL/min/1 73 87   eGFR       >59 mL/min/1 73 101   SL AMB BUN/CREATININE RATIO      10 - 24 16   Sodium      134 - 144 mmol/L 138   Potassium      3 5 - 5 2 mmol/L 4 3   Chloride      96 - 106 mmol/L 99   CO2      20 - 29 mmol/L 27   CALCIUM      8 6 - 10 2 mg/dL 9 4   Total Protein      6 0 - 8 5 g/dL 6 9   Albumin      3 8 - 4 8 g/dL 4 2   Globulin, Total      1 5 - 4 5 g/dL 2 7   Albumin/Globulin Ratio      1 2 - 2 2 1 6   TOTAL BILIRUBIN      0 0 - 1 2 mg/dL 1 1   ALKALINE PHOSPHATASE ISOENZYMES      39 - 117 IU/L 66   AST      0 - 40 IU/L 32   ALT      0 - 44 IU/L 33   White Blood Cell Count      3 4 - 10 8 x10E3/uL 6 4   Red Blood Cell Count      4 14 - 5 80 x10E6/uL 5 56   Hemoglobin      13 0 - 17 7 g/dL 16 9   HCT      37 5 - 51 0 % 49 8   MCV      79 - 97 fL 90   MCH      26 6 - 33 0 pg 30 4   MCHC        31 5 - 35 7 g/dL 33 9   RDW      11 6 - 15 4 % 12 7   Platelet Count      413 - 450 x10E3/uL 232   Testosterone, Total, LC/MS      264 - 916 ng/dL 453   TESTOSTERONE FREE      6 6 - 18 1 pg/mL 11 1   Hemoglobin A1C      4 8 - 5 6 % 6 1 (H)   eAG, EST AVG Glucose      mg/dL 128       I spent 22 minutes directly with the patient during this visit    VIRTUAL VISIT DISCLAIMER    Yudy Barragan acknowledges that he has consented to an online visit or consultation  He understands that the online visit is based solely on information provided by him, and that, in the absence of a face-to-face physical evaluation by the physician, the diagnosis he receives is both limited and provisional in terms of accuracy and completeness  This is not intended to replace a full medical face-to-face evaluation by the physician  Yudy Barragan understands and accepts these terms

## 2021-02-01 NOTE — TELEPHONE ENCOUNTER
Patient states he had a psa at [facility] in the fall  Can we acquire this result? Thanks

## 2021-04-06 ENCOUNTER — DOCUMENTATION (OUTPATIENT)
Dept: ENDOCRINOLOGY | Facility: HOSPITAL | Age: 66
End: 2021-04-06

## 2021-08-25 ENCOUNTER — TELEPHONE (OUTPATIENT)
Dept: ENDOCRINOLOGY | Facility: HOSPITAL | Age: 66
End: 2021-08-25

## 2021-08-25 DIAGNOSIS — E29.1 HYPOGONADISM IN MALE: ICD-10-CM

## 2021-08-25 RX ORDER — TESTOSTERONE CYPIONATE 200 MG/ML
INJECTION INTRAMUSCULAR
Start: 2021-08-25 | End: 2021-11-04 | Stop reason: SDUPTHER

## 2021-08-25 NOTE — TELEPHONE ENCOUNTER
Patient left a message  He needs a refill of the testosterone but recently had lab work completed that he would like reviewed   Results requested from Eb Hernadezemiah

## 2021-08-25 NOTE — TELEPHONE ENCOUNTER
Patient informed  He would like to adjust his dose to 0 70 and then repeat labs soon  He will  the lab orders on 9/3

## 2021-08-25 NOTE — TELEPHONE ENCOUNTER
That lab is still rather high  We can certainly repeat the labs again soon around the midpoint between injections (such as 7 days after an injection)

## 2021-08-25 NOTE — TELEPHONE ENCOUNTER
Spoke to patient  He had the lab work done 8 days after the testosterone shot  Last time he had this drawn it was done 10 days after the shot   He would like you to know this before making any adjustments

## 2021-08-25 NOTE — TELEPHONE ENCOUNTER
Recent testosterone level is actually quite elevated at 1015  Based on these values I would suggest we reduce his dose to 0 70 cc q14 days and repeat labs in 1 month

## 2022-01-20 LAB
ERYTHROCYTE [DISTWIDTH] IN BLOOD BY AUTOMATED COUNT: 12.8 % (ref 11.6–15.4)
HCT VFR BLD AUTO: 45.4 % (ref 37.5–51)
HGB BLD-MCNC: 15 G/DL (ref 13–17.7)
MCH RBC QN AUTO: 30.1 PG (ref 26.6–33)
MCHC RBC AUTO-ENTMCNC: 33 G/DL (ref 31.5–35.7)
MCV RBC AUTO: 91 FL (ref 79–97)
PLATELET # BLD AUTO: 324 X10E3/UL (ref 150–450)
RBC # BLD AUTO: 4.98 X10E6/UL (ref 4.14–5.8)
TESTOST FREE SERPL-MCNC: 12.7 PG/ML (ref 6.6–18.1)
TESTOST SERPL-MCNC: 577 NG/DL (ref 264–916)
WBC # BLD AUTO: 6.8 X10E3/UL (ref 3.4–10.8)

## 2022-01-31 ENCOUNTER — OFFICE VISIT (OUTPATIENT)
Dept: ENDOCRINOLOGY | Facility: HOSPITAL | Age: 67
End: 2022-01-31
Payer: COMMERCIAL

## 2022-01-31 ENCOUNTER — TELEPHONE (OUTPATIENT)
Dept: ADMINISTRATIVE | Facility: OTHER | Age: 67
End: 2022-01-31

## 2022-01-31 VITALS
HEIGHT: 71 IN | BODY MASS INDEX: 30.83 KG/M2 | SYSTOLIC BLOOD PRESSURE: 112 MMHG | WEIGHT: 220.2 LBS | HEART RATE: 108 BPM | DIASTOLIC BLOOD PRESSURE: 82 MMHG

## 2022-01-31 DIAGNOSIS — E29.1 HYPOGONADISM IN MALE: Primary | ICD-10-CM

## 2022-01-31 DIAGNOSIS — R73.03 PREDIABETES: ICD-10-CM

## 2022-01-31 PROCEDURE — 99214 OFFICE O/P EST MOD 30 MIN: CPT | Performed by: INTERNAL MEDICINE

## 2022-01-31 NOTE — TELEPHONE ENCOUNTER
Upon review of the In Basket request and the patient's chart, initial outreach has been made via fax, please see Contacts section for details       Thank you  Bon Tejeda

## 2022-01-31 NOTE — TELEPHONE ENCOUNTER
Upon review of the In Basket request we were able to locate, review, and update the patient chart as requested for CRC: Colonoscopy  Any additional questions or concerns should be emailed to the Practice Liaisons via [email or web address] email, please do not reply via In Basket      Thank you  Reece Stover

## 2022-01-31 NOTE — TELEPHONE ENCOUNTER
----- Message from [email or web address] [facility] sent at 1/31/2022  7:15 AM EST -----  Regarding: CRC  01/31/22 7:15 AM    Hello, our patient Morris Hermosillo has had a Colonoscopy performed August 2020 at [address]  Their number is [phone]      Thank you,  [facility] [place]

## 2022-01-31 NOTE — LETTER
Procedure Request Form: Colonoscopy      Date Requested: 22  Patient: [place]  Patient : 1955   Referring Provider: Justus Post        Date of Procedure ______________________________       The above patient has informed us that they have completed their   most recent Colonoscopy at your facility  Please complete   this form and attach all corresponding procedure reports/results  Comments __________________________________________________________  ____________________________________________________________________  ____________________________________________________________________  ____________________________________________________________________    Facility Completing Procedure _________________________________________    Form Completed By (print name) _______________________________________      Signature __________________________________________________________      These reports are needed for  compliance  Please fax this completed form and a copy of the procedure report to our office located at [place] [address] as soon as possible to [phone] [facility]: Phone [phone]    We thank you for your assistance in treating our mutual patient

## 2022-01-31 NOTE — PROGRESS NOTES
1/31/2022    Assessment/Plan      Diagnoses and all orders for this visit:    Hypogonadism in male  -     Testosterone, free, total Lab Collect; Future  -     CBC and Platelet- Lab Collect; Future  -     Comprehensive metabolic panel Lab Collect; Future  -     PSA, total screen Lab Collect; Future  -     HEMOGLOBIN A1C W/ EAG ESTIMATION Lab Collect; Future  -     Testosterone, free, total Lab Collect  -     CBC and Platelet- Lab Collect  -     Comprehensive metabolic panel Lab Collect  -     HEMOGLOBIN A1C W/ EAG ESTIMATION Lab Collect  -     Testosterone, free, total Lab Collect  -     CBC and Platelet- Lab Collect    Prediabetes  -     Testosterone, free, total Lab Collect; Future  -     CBC and Platelet- Lab Collect; Future  -     Comprehensive metabolic panel Lab Collect; Future  -     PSA, total screen Lab Collect; Future  -     HEMOGLOBIN A1C W/ EAG ESTIMATION Lab Collect; Future  -     Testosterone, free, total Lab Collect  -     CBC and Platelet- Lab Collect  -     Comprehensive metabolic panel Lab Collect  -     HEMOGLOBIN A1C W/ EAG ESTIMATION Lab Collect        Assessment/Plan:  Hypogonadism:  Overall doing well on current regimen  Suggest we continue current regimen and have him follow-up in the office in 1 year with labs just prior  I did mention subcutaneous testosterone options that are out there and he will let me know if he is interested in this  CC: Follow-up    History of Present Illness     HPI: Chelsie Bahena is a 61y o  year old male with history of hypogonadism who presents for a follow-up appointment  He continues on testosterone cypionate 200 milligrams/milliliter and receive 0 70 cc every 14 days  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  He receives his testosterone injections to his primary care provider    He reports recent COVID infection diagnosed about a month ago and overall still has some lingering symptoms such as fatigue but otherwise feeling well  Prior to that he was feeling great  He did note erectile dysfunction during his COVID infection which is slowly resolving  Review of Systems   Constitutional: Negative for fatigue  HENT: Negative for trouble swallowing and voice change  Eyes: Negative for visual disturbance  Respiratory: Negative for shortness of breath  Cardiovascular: Negative for palpitations and leg swelling  Gastrointestinal: Negative for abdominal pain, nausea and vomiting  Endocrine: Negative for polydipsia and polyuria  Musculoskeletal: Negative for arthralgias and myalgias  Skin: Negative for rash  Neurological: Negative for dizziness, tremors and weakness  Hematological: Negative for adenopathy  Psychiatric/Behavioral: Negative for agitation and confusion  Historical Information   History reviewed  No pertinent past medical history  History reviewed  No pertinent surgical history  Social History   Social History     Substance and Sexual Activity   Alcohol Use None     Social History     Substance and Sexual Activity   Drug Use Not on file     Social History     Tobacco Use   Smoking Status Never Smoker   Smokeless Tobacco Never Used     Family History:   Family History   Problem Relation Age of Onset    Diabetes type II Mother     Heart attack Mother     Heart disease Father     Diabetes type II Father     Osteoporosis Sister     Diabetes type II Brother     Heart disease Brother     No Known Problems Brother        Meds/Allergies   Current Outpatient Medications   Medication Sig Dispense Refill    famotidine (PEPCID) 20 mg tablet Take 20 mg by mouth 2 (two) times a day      hydrochlorothiazide (HYDRODIURIL) 25 mg tablet Take 25 mg by mouth daily  0    meloxicam (MOBIC) 15 mg tablet Take 15 mg by mouth daily as needed      testosterone cypionate (DEPO-TESTOSTERONE) 200 mg/mL SOLN INJECT 0 70 ML INTRAMUSCULARLY EVERY 14 DAYS   10 mL 5    cetirizine (ZyrTEC) 10 MG chewable tablet Chew 10 mg daily (Patient not taking: Reported on 1/31/2022 )       No current facility-administered medications for this visit  No Known Allergies    Objective   Vitals: Blood pressure 112/82, pulse (!) 108, height 5' 11" (1 803 m), weight 99 9 kg (220 lb 3 2 oz)  Invasive Devices  Report    None                 Physical Exam  Vitals reviewed  Constitutional:       General: He is not in acute distress  Appearance: He is well-developed  He is not diaphoretic  HENT:      Head: Normocephalic and atraumatic  Eyes:      Conjunctiva/sclera: Conjunctivae normal       Pupils: Pupils are equal, round, and reactive to light  Neck:      Thyroid: No thyromegaly  Cardiovascular:      Rate and Rhythm: Normal rate and regular rhythm  Pulmonary:      Effort: Pulmonary effort is normal  No respiratory distress  Breath sounds: Normal breath sounds  Abdominal:      General: Bowel sounds are normal       Palpations: Abdomen is soft  Musculoskeletal:         General: Normal range of motion  Cervical back: Normal range of motion and neck supple  Skin:     General: Skin is warm and dry  Findings: No rash  Neurological:      Mental Status: He is alert and oriented to person, place, and time  Motor: No abnormal muscle tone  Psychiatric:         Behavior: Behavior normal          The history was obtained from the review of the chart and from the patient      Lab Results:      Recent Results (from the past 99144 hour(s))   Comprehensive metabolic panel Lab Collect    Collection Time: 01/22/21  7:05 AM   Result Value Ref Range    Glucose, Random 96 65 - 99 mg/dL    BUN 15 8 - 27 mg/dL    Creatinine 0 93 0 76 - 1 27 mg/dL    eGFR Non  87 >59 mL/min/1 73    eGFR  101 >59 mL/min/1 73    SL AMB BUN/CREATININE RATIO 16 10 - 24    Sodium 138 134 - 144 mmol/L    Potassium 4 3 3 5 - 5 2 mmol/L    Chloride 99 96 - 106 mmol/L    CO2 27 20 - 29 mmol/L    CALCIUM 9 4 8 6 - 10 2 mg/dL    Protein, Total 6 9 6 0 - 8 5 g/dL    Albumin 4 2 3 8 - 4 8 g/dL    Globulin, Total 2 7 1 5 - 4 5 g/dL    Albumin/Globulin Ratio 1 6 1 2 - 2 2    TOTAL BILIRUBIN 1 1 0 0 - 1 2 mg/dL    Alk Phos Isoenzymes 66 39 - 117 IU/L    AST 32 0 - 40 IU/L    ALT 33 0 - 44 IU/L   CBC and Platelet- Lab Collect    Collection Time: 01/22/21  7:05 AM   Result Value Ref Range    White Blood Cell Count 6 4 3 4 - 10 8 x10E3/uL    Red Blood Cell Count 5 56 4 14 - 5 80 x10E6/uL    Hemoglobin 16 9 13 0 - 17 7 g/dL    HCT 49 8 37 5 - 51 0 %    MCV 90 79 - 97 fL    MCH 30 4 26 6 - 33 0 pg    MCHC 33 9 31 5 - 35 7 g/dL    RDW 12 7 11 6 - 15 4 %    Platelet Count 341 597 - 450 x10E3/uL   Testosterone, free, total Lab Collect    Collection Time: 01/22/21  7:05 AM   Result Value Ref Range    TESTOSTERONE TOTAL 453 264 - 916 ng/dL    Testosterone, Free 11 1 6 6 - 18 1 pg/mL   HEMOGLOBIN A1C W/ EAG ESTIMATION Lab Collect    Collection Time: 01/22/21  7:05 AM   Result Value Ref Range    Hemoglobin A1C 6 1 (H) 4 8 - 5 6 %    Estimated Average Glucose 128 mg/dL   Hemoglobin A1C    Collection Time: 08/19/21 12:00 AM   Result Value Ref Range    Hemoglobin A1C 5 9    Microalbumin / creatinine urine ratio    Collection Time: 08/19/21 12:00 AM   Result Value Ref Range    EXT Creatinine Urine 204 1     Microalbum  ,U,Random 5 1     EXTERNAL Microalb/Creat Ratio 2    Testosterone, free, total Lab Collect    Collection Time: 01/19/22  7:17 AM   Result Value Ref Range    TESTOSTERONE TOTAL 577 264 - 916 ng/dL    Testosterone, Free 12 7 6 6 - 18 1 pg/mL   CBC and Platelet- Lab Collect    Collection Time: 01/19/22  7:17 AM   Result Value Ref Range    White Blood Cell Count 6 8 3 4 - 10 8 x10E3/uL    Red Blood Cell Count 4 98 4 14 - 5 80 x10E6/uL    Hemoglobin 15 0 13 0 - 17 7 g/dL    HCT 45 4 37 5 - 51 0 %    MCV 91 79 - 97 fL    MCH 30 1 26 6 - 33 0 pg    MCHC 33 0 31 5 - 35 7 g/dL    RDW 12 8 11 6 - 15 4 %    Platelet Count 087 702 - 450 x10E3/uL         No future appointments  Portions of the record may have been created with voice recognition software  Occasional wrong word or "sound a like" substitutions may have occurred due to the inherent limitations of voice recognition software  Read the chart carefully and recognize, using context, where substitutions have occurred

## 2022-05-16 DIAGNOSIS — E29.1 HYPOGONADISM IN MALE: ICD-10-CM

## 2022-05-16 RX ORDER — TESTOSTERONE CYPIONATE 200 MG/ML
INJECTION INTRAMUSCULAR
Qty: 10 ML | Refills: 2 | Status: SHIPPED | OUTPATIENT
Start: 2022-05-16

## 2022-08-12 ENCOUNTER — TELEPHONE (OUTPATIENT)
Dept: ENDOCRINOLOGY | Facility: HOSPITAL | Age: 67
End: 2022-08-12

## 2022-08-12 NOTE — TELEPHONE ENCOUNTER
Received voicemails from patient  He will no longer be able to use [facility] for the testosterone cypionate  Patient may use [facility] going forward  He will follow up with the office on Monday with an update

## 2022-08-15 DIAGNOSIS — E29.1 HYPOGONADISM IN MALE: ICD-10-CM

## 2022-08-16 ENCOUNTER — TELEPHONE (OUTPATIENT)
Dept: ENDOCRINOLOGY | Facility: HOSPITAL | Age: 67
End: 2022-08-16

## 2022-08-16 RX ORDER — TESTOSTERONE CYPIONATE 200 MG/ML
INJECTION INTRAMUSCULAR
Qty: 10 ML | Refills: 2 | Status: SHIPPED | OUTPATIENT
Start: 2022-08-16

## 2022-08-16 NOTE — TELEPHONE ENCOUNTER
The patient called in late this morning and questioned whether or not his prescription was sent over to the pharmacy  I was able to confirm that it was sent over this morning and I was then able to call the patient and make him aware that it was sent over as well

## 2023-03-16 ENCOUNTER — OFFICE VISIT (OUTPATIENT)
Dept: ENDOCRINOLOGY | Facility: CLINIC | Age: 68
End: 2023-03-16

## 2023-03-16 VITALS
HEART RATE: 84 BPM | DIASTOLIC BLOOD PRESSURE: 84 MMHG | WEIGHT: 231 LBS | SYSTOLIC BLOOD PRESSURE: 136 MMHG | BODY MASS INDEX: 32.34 KG/M2 | HEIGHT: 71 IN

## 2023-03-16 DIAGNOSIS — R73.03 PREDIABETES: ICD-10-CM

## 2023-03-16 DIAGNOSIS — Z12.5 ENCOUNTER FOR SCREENING FOR MALIGNANT NEOPLASM OF PROSTATE: ICD-10-CM

## 2023-03-16 DIAGNOSIS — E29.1 HYPOGONADISM IN MALE: Primary | ICD-10-CM

## 2023-03-16 DIAGNOSIS — R79.9 ABNORMAL FINDING OF BLOOD CHEMISTRY, UNSPECIFIED: ICD-10-CM

## 2023-03-16 NOTE — PROGRESS NOTES
3/16/2023    Assessment/Plan      Diagnoses and all orders for this visit:    Hypogonadism in male  -     Testosterone, free, total Lab Collect; Future  -     CBC and Platelet- Lab Collect; Future  -     Comprehensive metabolic panel Lab Collect; Future  -     TSH, 3rd generation Lab Collect; Future  -     T4, free- Lab Collect; Future  -     PSA, total screen Lab Collect; Future  -     HEMOGLOBIN A1C W/ EAG ESTIMATION Lab Collect; Future  -     Lipid Panel with Direct LDL reflex Lab Collect; Future    Prediabetes  -     Testosterone, free, total Lab Collect; Future  -     CBC and Platelet- Lab Collect; Future  -     Comprehensive metabolic panel Lab Collect; Future  -     TSH, 3rd generation Lab Collect; Future  -     T4, free- Lab Collect; Future  -     PSA, total screen Lab Collect; Future  -     HEMOGLOBIN A1C W/ EAG ESTIMATION Lab Collect; Future  -     Lipid Panel with Direct LDL reflex Lab Collect; Future    Encounter for screening for malignant neoplasm of prostate  -     PSA, total screen Lab Collect; Future    Abnormal finding of blood chemistry, unspecified  -     Lipid Panel with Direct LDL reflex Lab Collect; Future        Assessment/Plan:  Hypogonadism: Clinically biochemically overall doing well on current regimen  I did give him the name of Marquise Hernandez as this may be easier for him to inject at home if he is interested  Otherwise we will continue current regimen for now repeat labs annually  Follow-up in the office in 1 year  He will call sooner with any questions or concerns  CC: Follow-up    History of Present Illness     HPI: Carson Peraza is a 74y o  year old male with history of hypogonadism who presents for a follow-up appointment  He continues on testosterone cypionate 200 mg/mL and uses 0 7 cc every 14 days  He has a history of primary hypogonadism on the basis of previous history of cryptochodism which required surgical intervention in his 25s    Testosterone injections are still given via primary care provider  Presents today overall feeling well  No new health issues or symptoms of concern  Overall feeling well on current dose  Review of Systems   Constitutional: Negative for fatigue  HENT: Negative for trouble swallowing and voice change  Eyes: Negative for visual disturbance  Respiratory: Negative for shortness of breath  Cardiovascular: Negative for palpitations and leg swelling  Gastrointestinal: Negative for abdominal pain, nausea and vomiting  Endocrine: Negative for polydipsia and polyuria  Musculoskeletal: Negative for arthralgias and myalgias  Skin: Negative for rash  Neurological: Negative for dizziness, tremors and weakness  Hematological: Negative for adenopathy  Psychiatric/Behavioral: Negative for agitation and confusion  Historical Information   History reviewed  No pertinent past medical history  Past Surgical History:   Procedure Laterality Date   • NO PAST SURGERIES       Social History   Social History     Substance and Sexual Activity   Alcohol Use Not Currently     Social History     Substance and Sexual Activity   Drug Use Never     Social History     Tobacco Use   Smoking Status Never   Smokeless Tobacco Never     Family History:   Family History   Problem Relation Age of Onset   • Diabetes type II Mother    • Heart attack Mother    • Heart disease Father    • Diabetes type II Father    • Osteoporosis Sister    • Diabetes type II Brother    • Heart disease Brother    • No Known Problems Brother        Meds/Allergies   Current Outpatient Medications   Medication Sig Dispense Refill   • famotidine (PEPCID) 20 mg tablet Take 20 mg by mouth 2 (two) times a day     • hydrochlorothiazide (HYDRODIURIL) 25 mg tablet Take 25 mg by mouth daily  0   • meloxicam (MOBIC) 15 mg tablet Take 15 mg by mouth daily as needed     • testosterone cypionate (DEPO-TESTOSTERONE) 200 mg/mL SOLN INJECT 0 70 ML INTRAMUSCULARLY EVERY 14 DAYS   10 mL 2   • cetirizine (ZyrTEC) 10 MG chewable tablet Chew 10 mg daily (Patient not taking: Reported on 1/31/2022)       No current facility-administered medications for this visit  No Known Allergies    Objective   Vitals: Blood pressure 136/84, pulse 84, height 5' 11" (1 803 m), weight 105 kg (231 lb)  Invasive Devices     None                 Physical Exam  Vitals reviewed  Constitutional:       General: He is not in acute distress  Appearance: He is well-developed  He is not diaphoretic  HENT:      Head: Normocephalic and atraumatic  Eyes:      Conjunctiva/sclera: Conjunctivae normal       Pupils: Pupils are equal, round, and reactive to light  Neck:      Thyroid: No thyromegaly  Cardiovascular:      Rate and Rhythm: Normal rate and regular rhythm  Pulmonary:      Effort: Pulmonary effort is normal  No respiratory distress  Breath sounds: Normal breath sounds  Abdominal:      General: Bowel sounds are normal       Palpations: Abdomen is soft  Musculoskeletal:         General: Normal range of motion  Cervical back: Normal range of motion and neck supple  Skin:     General: Skin is warm and dry  Findings: No rash  Neurological:      Mental Status: He is alert and oriented to person, place, and time  Motor: No abnormal muscle tone  Psychiatric:         Behavior: Behavior normal          The history was obtained from the review of the chart and from the patient  Lab Results:      Labs from [facility] 11/23/22: Total testosterone 670 ,  PSA 1 2, TSH 1 37, A1c 6, sodium 137, potassium 3 7, BUN 28, creatinine 1 14, GFR 72, glucose 110, calcium 9 4, liver function within normal limits, total cholesterol 172, triglycerides 71, , HDL 44, hematocrit 49 2, hemoglobin 16 8, T4 5 1      No future appointments  Portions of the record may have been created with voice recognition software   Occasional wrong word or "sound a like" substitutions may have occurred due to the inherent limitations of voice recognition software  Read the chart carefully and recognize, using context, where substitutions have occurred

## 2023-05-18 DIAGNOSIS — E29.1 HYPOGONADISM IN MALE: ICD-10-CM

## 2023-05-18 RX ORDER — TESTOSTERONE CYPIONATE 200 MG/ML
INJECTION, SOLUTION INTRAMUSCULAR
Qty: 10 ML | Refills: 1 | Status: SHIPPED | OUTPATIENT
Start: 2023-05-18

## 2024-02-26 ENCOUNTER — OFFICE VISIT (OUTPATIENT)
Dept: ENDOCRINOLOGY | Facility: CLINIC | Age: 69
End: 2024-02-26
Payer: MEDICARE

## 2024-02-26 VITALS
SYSTOLIC BLOOD PRESSURE: 124 MMHG | WEIGHT: 242.6 LBS | DIASTOLIC BLOOD PRESSURE: 82 MMHG | BODY MASS INDEX: 33.96 KG/M2 | OXYGEN SATURATION: 95 % | HEIGHT: 71 IN | HEART RATE: 88 BPM

## 2024-02-26 DIAGNOSIS — Z12.5 ENCOUNTER FOR SCREENING FOR MALIGNANT NEOPLASM OF PROSTATE: ICD-10-CM

## 2024-02-26 DIAGNOSIS — R73.03 PREDIABETES: ICD-10-CM

## 2024-02-26 DIAGNOSIS — E29.1 HYPOGONADISM IN MALE: ICD-10-CM

## 2024-02-26 DIAGNOSIS — E29.1 PRIMARY HYPOGONADISM IN MALE: Primary | ICD-10-CM

## 2024-02-26 PROCEDURE — 99214 OFFICE O/P EST MOD 30 MIN: CPT | Performed by: INTERNAL MEDICINE

## 2024-02-26 RX ORDER — TESTOSTERONE CYPIONATE 200 MG/ML
INJECTION, SOLUTION INTRAMUSCULAR
Qty: 10 ML | Refills: 1 | Status: SHIPPED | OUTPATIENT
Start: 2024-02-26

## 2024-02-26 RX ORDER — ALPRAZOLAM 0.5 MG/1
TABLET ORAL
COMMUNITY
Start: 2024-01-29

## 2024-02-26 NOTE — PROGRESS NOTES
2/26/2024    Assessment/Plan      Diagnoses and all orders for this visit:    Primary hypogonadism in male  -     Testosterone, free, total; Future  -     Comprehensive metabolic panel; Future  -     CBC; Future  -     PSA, Total Screen; Future    Prediabetes    Hypogonadism in male  -     testosterone cypionate (DEPO-TESTOSTERONE) 200 mg/mL SOLN; INJECT 0.70 ML INTRAMUSCULARLY EVERY 14 DAYS.    Encounter for screening for malignant neoplasm of prostate  -     PSA, Total Screen; Future    Other orders  -     ALPRAZolam (XANAX) 0.5 mg tablet; take 1 tablet by mouth once daily if needed for sleep        Assessment/Plan:  Hypogonadism: Overall doing well on current regimen.  We will continue current regimen and see him back in the office in 1 year.  Repeat labs prior to next appointment.  He will contact me sooner with any questions or concerns.      CC: Follow-up    History of Present Illness     HPI: James Seip is a 68 y.o. year old male with history of hypogonadism who presents for a follow-up appointment.  He continues on testosterone cypionate 200 mg/mL and uses 0.7 mL every 14 days.  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  He presents today overall feeling okay.  He does report since his last visit his mother passed away last March which has been tough for him.  He denies any new health issues or symptoms of concern today.  After his mother passed, he was not watching his diet as well and plans to institute dietary improvements between now and his next labs.    Review of Systems   Constitutional:  Negative for fatigue.   HENT:  Negative for trouble swallowing and voice change.    Eyes:  Negative for visual disturbance.   Respiratory:  Negative for shortness of breath.    Cardiovascular:  Negative for palpitations and leg swelling.   Gastrointestinal:  Negative for abdominal pain, nausea and vomiting.   Endocrine:  "Negative for polydipsia and polyuria.   Musculoskeletal:  Negative for arthralgias and myalgias.   Skin:  Negative for rash.   Neurological:  Negative for dizziness, tremors and weakness.   Hematological:  Negative for adenopathy.   Psychiatric/Behavioral:  Negative for agitation and confusion.        Historical Information   History reviewed. No pertinent past medical history.  Past Surgical History:   Procedure Laterality Date    NO PAST SURGERIES       Social History   Social History     Substance and Sexual Activity   Alcohol Use Not Currently     Social History     Substance and Sexual Activity   Drug Use Never     Social History     Tobacco Use   Smoking Status Never   Smokeless Tobacco Never     Family History:   Family History   Problem Relation Age of Onset    Diabetes type II Mother     Heart attack Mother     Heart disease Father     Diabetes type II Father     Osteoporosis Sister     Diabetes type II Brother     Heart disease Brother     No Known Problems Brother        Meds/Allergies   Current Outpatient Medications   Medication Sig Dispense Refill    ALPRAZolam (XANAX) 0.5 mg tablet take 1 tablet by mouth once daily if needed for sleep      cetirizine (ZyrTEC) 10 MG chewable tablet Chew 10 mg daily      famotidine (PEPCID) 20 mg tablet Take 20 mg by mouth 2 (two) times a day      hydrochlorothiazide (HYDRODIURIL) 25 mg tablet Take 25 mg by mouth daily  0    meloxicam (MOBIC) 15 mg tablet Take 15 mg by mouth daily as needed      testosterone cypionate (DEPO-TESTOSTERONE) 200 mg/mL SOLN INJECT 0.70 ML INTRAMUSCULARLY EVERY 14 DAYS. 10 mL 1     No current facility-administered medications for this visit.     No Known Allergies    Objective   Vitals: Blood pressure 124/82, pulse 88, height 5' 11\" (1.803 m), weight 110 kg (242 lb 9.6 oz), SpO2 95%.  Invasive Devices       None                   Physical Exam  Vitals reviewed.   Constitutional:       General: He is not in acute distress.     Appearance: He " "is well-developed. He is not diaphoretic.   HENT:      Head: Normocephalic and atraumatic.   Eyes:      Conjunctiva/sclera: Conjunctivae normal.      Pupils: Pupils are equal, round, and reactive to light.   Neck:      Thyroid: No thyromegaly.   Cardiovascular:      Rate and Rhythm: Normal rate and regular rhythm.   Pulmonary:      Effort: Pulmonary effort is normal. No respiratory distress.      Breath sounds: Normal breath sounds.   Abdominal:      General: Bowel sounds are normal.      Palpations: Abdomen is soft.   Musculoskeletal:         General: Normal range of motion.      Cervical back: Normal range of motion and neck supple.   Skin:     General: Skin is warm and dry.      Findings: No rash.   Neurological:      Mental Status: He is alert and oriented to person, place, and time.      Motor: No abnormal muscle tone.   Psychiatric:         Behavior: Behavior normal.         The history was obtained from the review of the chart and from the patient.    Lab Results:      Labs from [facility] on 12/21/2023:  Total testosterone 452.8, A1c 6.1, PSA 1.3, total T45.5, TSH 1.78, sodium 141, potassium 3.9, BUN 24, creatinine 1.08, GFR 76, liver function within normal limits, fasting glucose 113, total cholesterol 185, triglycerides 95, , HDL 46, hemoglobin 17.4, hematocrit 50.2      No future appointments.      Portions of the record may have been created with voice recognition software. Occasional wrong word or \"sound a like\" substitutions may have occurred due to the inherent limitations of voice recognition software. Read the chart carefully and recognize, using context, where substitutions have occurred.    "

## 2024-11-26 ENCOUNTER — TELEPHONE (OUTPATIENT)
Age: 69
End: 2024-11-26

## 2024-11-26 DIAGNOSIS — E29.1 HYPOGONADISM IN MALE: ICD-10-CM

## 2024-11-26 RX ORDER — TESTOSTERONE CYPIONATE 200 MG/ML
INJECTION, SOLUTION INTRAMUSCULAR
Qty: 10 ML | Refills: 1 | Status: SHIPPED | OUTPATIENT
Start: 2024-11-26

## 2024-11-26 NOTE — TELEPHONE ENCOUNTER
testosterone cypionate (DEPO-TESTOSTERONE) 200 mg/mL SOLN INJECT 0.70 ML INTRAMUSCULARLY EVERY 14 DAYS.                   Patient is using his last vial today but states that he won't be able to refill it until one week from now but he wants them to have it ahead of time so there is not delay with him getting the injection in time.    Patient would like a call back with an update      [facility] - gNhia Rizzo

## 2024-12-31 ENCOUNTER — TELEPHONE (OUTPATIENT)
Age: 69
End: 2024-12-31

## 2024-12-31 NOTE — TELEPHONE ENCOUNTER
Patient states he went for labs this morning for PCP and they rachel a total testosterone. Made aware provider ordered testosterone free. Patient states he wll call lab and see if it can be added on.

## 2025-03-03 ENCOUNTER — OFFICE VISIT (OUTPATIENT)
Dept: ENDOCRINOLOGY | Facility: CLINIC | Age: 70
End: 2025-03-03
Payer: MEDICARE

## 2025-03-03 VITALS
DIASTOLIC BLOOD PRESSURE: 70 MMHG | BODY MASS INDEX: 32.17 KG/M2 | HEIGHT: 71 IN | SYSTOLIC BLOOD PRESSURE: 120 MMHG | WEIGHT: 229.8 LBS

## 2025-03-03 DIAGNOSIS — Z12.5 ENCOUNTER FOR SCREENING FOR MALIGNANT NEOPLASM OF PROSTATE: ICD-10-CM

## 2025-03-03 DIAGNOSIS — R73.03 PREDIABETES: ICD-10-CM

## 2025-03-03 DIAGNOSIS — E29.1 PRIMARY HYPOGONADISM IN MALE: Primary | ICD-10-CM

## 2025-03-03 PROCEDURE — 99214 OFFICE O/P EST MOD 30 MIN: CPT | Performed by: INTERNAL MEDICINE

## 2025-03-03 PROCEDURE — G2211 COMPLEX E/M VISIT ADD ON: HCPCS | Performed by: INTERNAL MEDICINE

## 2025-03-03 NOTE — PROGRESS NOTES
3/3/2025    Assessment & Plan      Diagnoses and all orders for this visit:    Primary hypogonadism in male  -     Testosterone, free, total; Future  -     Comprehensive metabolic panel; Future  -     CBC; Future  -     PSA, Total Screen; Future    Prediabetes    Encounter for screening for malignant neoplasm of prostate  -     PSA, Total Screen; Future        Assessment/Plan:  Hypogonadism: Overall doing well biochemically and clinically on current regimen.  Continue current regimen follow-up in 1 year with preceding labs.  He will call me sooner with any questions or concerns.      CC: Follow-up    History of Present Illness     HPI: James Seip is a 69 y.o. year old male with history of hypogonadism who presents for a follow-up appointment.  He continues on testosterone cypionate 200 mg/mL and uses 0.7 mL every 14 days.  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  He presents today overall feeling okay.  He presents today overall feeling well.  In November he tore his Achilles and had surgery and is undergoing wound care for wound in that location and that regard has been less active and A1c is increased to 6.3.    Review of Systems   All other systems reviewed and are negative.      Historical Information   No past medical history on file.  Past Surgical History:   Procedure Laterality Date    NO PAST SURGERIES       Social History   Social History     Substance and Sexual Activity   Alcohol Use Not Currently     Social History     Substance and Sexual Activity   Drug Use Never     Social History     Tobacco Use   Smoking Status Never   Smokeless Tobacco Never     Family History:   Family History   Problem Relation Age of Onset    Diabetes type II Mother     Heart attack Mother     Heart disease Father     Diabetes type II Father     Osteoporosis Sister     Diabetes type II Brother     Heart disease Brother     No  "Known Problems Brother        Meds/Allergies   Current Outpatient Medications   Medication Sig Dispense Refill    ALPRAZolam (XANAX) 0.5 mg tablet take 1 tablet by mouth once daily if needed for sleep      famotidine (PEPCID) 20 mg tablet Take 20 mg by mouth 2 (two) times a day      hydrochlorothiazide (HYDRODIURIL) 25 mg tablet Take 25 mg by mouth daily  0    testosterone cypionate (DEPO-TESTOSTERONE) 200 mg/mL SOLN INJECT 0.70 ML INTRAMUSCULARLY EVERY 14 DAYS. 10 mL 1    cetirizine (ZyrTEC) 10 MG chewable tablet Chew 10 mg daily (Patient not taking: Reported on 3/3/2025)      meloxicam (MOBIC) 15 mg tablet Take 15 mg by mouth daily as needed (Patient not taking: Reported on 3/3/2025)       No current facility-administered medications for this visit.     No Known Allergies    Objective   Vitals: Blood pressure 120/70, height 5' 11\" (1.803 m), weight 104 kg (229 lb 12.8 oz).  Invasive Devices       None                   Physical Exam  Vitals reviewed.   Constitutional:       General: He is not in acute distress.     Appearance: He is well-developed. He is not diaphoretic.   HENT:      Head: Normocephalic and atraumatic.   Eyes:      Conjunctiva/sclera: Conjunctivae normal.      Pupils: Pupils are equal, round, and reactive to light.   Neck:      Thyroid: No thyromegaly.   Cardiovascular:      Rate and Rhythm: Normal rate and regular rhythm.   Pulmonary:      Effort: Pulmonary effort is normal. No respiratory distress.      Breath sounds: Normal breath sounds.   Abdominal:      General: Bowel sounds are normal.      Palpations: Abdomen is soft.   Musculoskeletal:         General: Normal range of motion.      Cervical back: Normal range of motion and neck supple.   Skin:     General: Skin is warm and dry.      Findings: No rash.   Neurological:      Mental Status: He is alert and oriented to person, place, and time.      Motor: No abnormal muscle tone.   Psychiatric:         Behavior: Behavior normal.         The " "history was obtained from the review of the chart and from the patient.    Lab Results:      Labs from [facility] on 12/31/2024:  Hematocrit 48.1, glucose 111, BUN 22, creatinine 0.92, GFR 91, sodium 140, potassium 4.2, liver function within normal limits, A1c 6.3, testosterone 672, PSA 1.0, TSH 1.48      No future appointments.      Portions of the record may have been created with voice recognition software. Occasional wrong word or \"sound a like\" substitutions may have occurred due to the inherent limitations of voice recognition software. Read the chart carefully and recognize, using context, where substitutions have occurred.    "

## 2025-07-11 DIAGNOSIS — E29.1 HYPOGONADISM IN MALE: ICD-10-CM

## 2025-07-11 NOTE — TELEPHONE ENCOUNTER
Reason for call:   [x] Refill   [] Prior Auth  [] Other:     Office:   [] PCP/Provider -   [x] Specialty/Provider -  [facility]        Medication:     testosterone cypionate (DEPO-TESTOSTERONE) 200 mg/mL SOLN INJECT 0.70 ML INTRAMUSCULARLY EVERY 14 DAYS          Pharmacy:  [id number] - [place]     Local Pharmacy   Does the patient have enough for 3 days?   [x] Yes   [] No - Send as HP to POD

## 2025-07-14 RX ORDER — TESTOSTERONE CYPIONATE 200 MG/ML
INJECTION, SOLUTION INTRAMUSCULAR
Qty: 10 ML | Refills: 0 | Status: SHIPPED | OUTPATIENT
Start: 2025-07-14

## 2025-07-20 ENCOUNTER — VBI (OUTPATIENT)
Dept: ADMINISTRATIVE | Facility: OTHER | Age: 70
End: 2025-07-20

## 2025-07-21 NOTE — TELEPHONE ENCOUNTER
Upon review of the In Basket request we were able to locate, review, and update the patient chart as requested for CRC: Colonoscopy.    Any additional questions or concerns should be emailed to the Practice Liaisons via the appropriate education email address, please do not reply via In Basket.    Thank you  Adelina Francois MA   [facility]

## 2025-07-23 ENCOUNTER — CLINICAL SUPPORT (OUTPATIENT)
Age: 70
End: 2025-07-23
Payer: MEDICARE

## 2025-07-23 DIAGNOSIS — E29.1 HYPOGONADISM IN MALE: Primary | ICD-10-CM

## 2025-07-23 PROCEDURE — 96372 THER/PROPH/DIAG INJ SC/IM: CPT

## 2025-07-23 RX ORDER — TESTOSTERONE CYPIONATE 200 MG/ML
200 INJECTION, SOLUTION INTRAMUSCULAR ONCE
Status: COMPLETED | OUTPATIENT
Start: 2025-07-23 | End: 2025-07-23

## 2025-07-23 RX ADMIN — TESTOSTERONE CYPIONATE 200 MG: 200 INJECTION, SOLUTION INTRAMUSCULAR at 07:56

## 2025-07-31 PROBLEM — G47.33 OBSTRUCTIVE SLEEP APNEA (ADULT) (PEDIATRIC): Status: ACTIVE | Noted: 2025-07-31

## 2025-07-31 PROBLEM — M50.30 OTHER CERVICAL DISC DEGENERATION, UNSPECIFIED CERVICAL REGION: Status: ACTIVE | Noted: 2025-07-31

## 2025-07-31 PROBLEM — M25.562 PAIN IN LEFT KNEE: Status: ACTIVE | Noted: 2025-07-31

## 2025-07-31 PROBLEM — L03.032 CELLULITIS OF LEFT TOE: Status: ACTIVE | Noted: 2025-07-31

## 2025-07-31 PROBLEM — S86.019A STRAIN OF UNSPECIFIED ACHILLES TENDON, INITIAL ENCOUNTER: Status: ACTIVE | Noted: 2025-07-31

## 2025-07-31 PROBLEM — E78.5 HYPERLIPIDEMIA, UNSPECIFIED: Status: ACTIVE | Noted: 2025-07-31

## 2025-07-31 PROBLEM — H93.19 TINNITUS, UNSPECIFIED EAR: Status: ACTIVE | Noted: 2025-07-31

## 2025-07-31 PROBLEM — I10 ESSENTIAL (PRIMARY) HYPERTENSION: Status: ACTIVE | Noted: 2025-07-31

## 2025-07-31 PROBLEM — R53.83 OTHER FATIGUE: Status: ACTIVE | Noted: 2025-07-31

## 2025-07-31 PROBLEM — K21.9 GASTRO-ESOPHAGEAL REFLUX DISEASE WITHOUT ESOPHAGITIS: Status: ACTIVE | Noted: 2025-07-31

## 2025-07-31 PROBLEM — M25.572 PAIN IN LEFT ANKLE AND JOINTS OF LEFT FOOT: Status: ACTIVE | Noted: 2025-07-31

## 2025-07-31 PROBLEM — R73.9 HYPERGLYCEMIA, UNSPECIFIED: Status: ACTIVE | Noted: 2025-07-31

## 2025-07-31 PROBLEM — M75.52 BURSITIS OF LEFT SHOULDER: Status: ACTIVE | Noted: 2025-07-31

## 2025-07-31 PROBLEM — R73.09 OTHER ABNORMAL GLUCOSE: Status: ACTIVE | Noted: 2025-07-31

## 2025-07-31 PROBLEM — E66.9 OBESITY, UNSPECIFIED: Status: ACTIVE | Noted: 2025-07-31

## 2025-07-31 PROBLEM — M54.17 RADICULOPATHY, LUMBOSACRAL REGION: Status: ACTIVE | Noted: 2025-07-31

## 2025-07-31 PROBLEM — G95.0 SYRINGOMYELIA AND SYRINGOBULBIA (HCC): Status: ACTIVE | Noted: 2025-07-31

## 2025-07-31 PROBLEM — M26.603 BILATERAL TEMPOROMANDIBULAR JOINT DISORDER, UNSPECIFIED: Status: ACTIVE | Noted: 2025-07-31

## 2025-07-31 PROBLEM — M19.90 UNSPECIFIED OSTEOARTHRITIS, UNSPECIFIED SITE: Status: ACTIVE | Noted: 2025-07-31

## 2025-07-31 PROBLEM — F33.1 MAJOR DEPRESSIVE DISORDER, RECURRENT, MODERATE (HCC): Status: ACTIVE | Noted: 2025-07-31

## 2025-07-31 PROBLEM — R93.7 ABNORMAL FINDINGS ON DIAGNOSTIC IMAGING OF OTHER PARTS OF MUSCULOSKELETAL SYSTEM: Status: ACTIVE | Noted: 2025-07-31

## 2025-07-31 PROBLEM — M51.362 OTHER INTERVERTEBRAL DISC DEGENERATION, LUMBAR REGION WITH DISCOGENIC BACK PAIN AND LOWER EXTREMITY PAIN: Status: ACTIVE | Noted: 2025-07-31

## 2025-07-31 PROBLEM — R42 DIZZINESS AND GIDDINESS: Status: ACTIVE | Noted: 2025-07-31

## 2025-07-31 PROBLEM — M25.561 PAIN IN RIGHT KNEE: Status: ACTIVE | Noted: 2025-07-31

## 2025-07-31 PROBLEM — G47.00 INSOMNIA, UNSPECIFIED: Status: ACTIVE | Noted: 2025-07-31

## 2025-07-31 PROBLEM — F41.1 GENERALIZED ANXIETY DISORDER: Status: ACTIVE | Noted: 2025-07-31

## 2025-07-31 PROBLEM — R20.2 PARESTHESIA OF SKIN: Status: ACTIVE | Noted: 2025-07-31

## 2025-07-31 PROBLEM — G62.9 POLYNEUROPATHY, UNSPECIFIED: Status: ACTIVE | Noted: 2025-07-31

## 2025-07-31 PROBLEM — J30.9 ALLERGIC RHINITIS, UNSPECIFIED: Status: ACTIVE | Noted: 2025-07-31

## 2025-07-31 PROBLEM — S86.011D STRAIN OF ACHILLES TENDON, RIGHT, SUBSEQUENT ENCOUNTER: Status: ACTIVE | Noted: 2025-07-31

## 2025-07-31 PROBLEM — H91.90 UNSPECIFIED HEARING LOSS, UNSPECIFIED EAR: Status: ACTIVE | Noted: 2025-07-31

## 2025-07-31 PROBLEM — H61.20 IMPACTED CERUMEN, UNSPECIFIED EAR: Status: ACTIVE | Noted: 2025-07-31

## 2025-08-06 ENCOUNTER — OFFICE VISIT (OUTPATIENT)
Age: 70
End: 2025-08-06
Payer: MEDICARE

## 2025-08-06 ENCOUNTER — CLINICAL SUPPORT (OUTPATIENT)
Age: 70
End: 2025-08-06
Payer: MEDICARE

## 2025-08-06 VITALS
HEIGHT: 71 IN | BODY MASS INDEX: 33.32 KG/M2 | DIASTOLIC BLOOD PRESSURE: 80 MMHG | HEART RATE: 86 BPM | OXYGEN SATURATION: 94 % | TEMPERATURE: 99.1 F | WEIGHT: 238 LBS | SYSTOLIC BLOOD PRESSURE: 120 MMHG

## 2025-08-06 DIAGNOSIS — M25.532 WRIST PAIN, ACUTE, LEFT: ICD-10-CM

## 2025-08-06 DIAGNOSIS — F51.01 PRIMARY INSOMNIA: ICD-10-CM

## 2025-08-06 DIAGNOSIS — G47.33 OSA (OBSTRUCTIVE SLEEP APNEA): ICD-10-CM

## 2025-08-06 DIAGNOSIS — R79.89 LOW TESTOSTERONE IN MALE: Primary | ICD-10-CM

## 2025-08-06 DIAGNOSIS — M77.02 MEDIAL EPICONDYLITIS OF LEFT ELBOW: Primary | ICD-10-CM

## 2025-08-06 PROCEDURE — G2211 COMPLEX E/M VISIT ADD ON: HCPCS | Performed by: INTERNAL MEDICINE

## 2025-08-06 PROCEDURE — 96372 THER/PROPH/DIAG INJ SC/IM: CPT

## 2025-08-06 PROCEDURE — 99214 OFFICE O/P EST MOD 30 MIN: CPT | Performed by: INTERNAL MEDICINE

## 2025-08-06 RX ORDER — MULTIVITAMIN
1 TABLET ORAL DAILY
COMMUNITY

## 2025-08-06 RX ORDER — ECONAZOLE NITRATE 10 MG/G
1 CREAM TOPICAL 2 TIMES DAILY
COMMUNITY
Start: 2025-07-17

## 2025-08-06 RX ORDER — TESTOSTERONE CYPIONATE 200 MG/ML
50 INJECTION, SOLUTION INTRAMUSCULAR
Status: SHIPPED | OUTPATIENT
Start: 2025-08-06

## 2025-08-06 RX ORDER — LORATADINE 10 MG/1
10 TABLET ORAL DAILY
COMMUNITY

## 2025-08-06 RX ADMIN — TESTOSTERONE CYPIONATE 50 MG: 200 INJECTION, SOLUTION INTRAMUSCULAR at 16:40

## 2025-08-19 ENCOUNTER — CLINICAL SUPPORT (OUTPATIENT)
Age: 70
End: 2025-08-19
Payer: MEDICARE

## 2025-08-19 DIAGNOSIS — R79.89 LOW TESTOSTERONE IN MALE: Primary | ICD-10-CM

## 2025-08-19 PROCEDURE — 96372 THER/PROPH/DIAG INJ SC/IM: CPT

## 2025-08-19 RX ORDER — TESTOSTERONE CYPIONATE 200 MG/ML
50 INJECTION, SOLUTION INTRAMUSCULAR ONCE
Status: SHIPPED | OUTPATIENT
Start: 2025-08-19

## 2025-08-19 RX ADMIN — TESTOSTERONE CYPIONATE 50 MG: 200 INJECTION, SOLUTION INTRAMUSCULAR at 16:25
